# Patient Record
Sex: MALE | Race: OTHER
[De-identification: names, ages, dates, MRNs, and addresses within clinical notes are randomized per-mention and may not be internally consistent; named-entity substitution may affect disease eponyms.]

---

## 2019-02-26 ENCOUNTER — HOSPITAL ENCOUNTER (INPATIENT)
Dept: HOSPITAL 25 - ED | Age: 74
LOS: 7 days | DRG: 97 | End: 2019-03-05
Attending: INTERNAL MEDICINE | Admitting: INTERNAL MEDICINE
Payer: MEDICARE

## 2019-02-26 DIAGNOSIS — G93.41: ICD-10-CM

## 2019-02-26 DIAGNOSIS — E66.01: ICD-10-CM

## 2019-02-26 DIAGNOSIS — J81.0: ICD-10-CM

## 2019-02-26 DIAGNOSIS — R79.89: ICD-10-CM

## 2019-02-26 DIAGNOSIS — R29.703: ICD-10-CM

## 2019-02-26 DIAGNOSIS — E78.00: ICD-10-CM

## 2019-02-26 DIAGNOSIS — E87.0: ICD-10-CM

## 2019-02-26 DIAGNOSIS — E11.65: ICD-10-CM

## 2019-02-26 DIAGNOSIS — I25.10: ICD-10-CM

## 2019-02-26 DIAGNOSIS — A86: Primary | ICD-10-CM

## 2019-02-26 DIAGNOSIS — Z95.5: ICD-10-CM

## 2019-02-26 DIAGNOSIS — R47.01: ICD-10-CM

## 2019-02-26 DIAGNOSIS — I46.9: ICD-10-CM

## 2019-02-26 DIAGNOSIS — E03.9: ICD-10-CM

## 2019-02-26 DIAGNOSIS — R40.2412: ICD-10-CM

## 2019-02-26 DIAGNOSIS — Z95.1: ICD-10-CM

## 2019-02-26 DIAGNOSIS — I65.22: ICD-10-CM

## 2019-02-26 DIAGNOSIS — E78.5: ICD-10-CM

## 2019-02-26 DIAGNOSIS — G04.81: ICD-10-CM

## 2019-02-26 DIAGNOSIS — I10: ICD-10-CM

## 2019-02-26 DIAGNOSIS — N17.9: ICD-10-CM

## 2019-02-26 LAB
ALBUMIN SERPL BCG-MCNC: 4.5 G/DL (ref 3.2–5.2)
ALBUMIN/GLOB SERPL: 1.3 {RATIO} (ref 1–3)
ALP SERPL-CCNC: 87 U/L (ref 34–104)
ALT SERPL W P-5'-P-CCNC: 20 U/L (ref 7–52)
ANION GAP SERPL CALC-SCNC: 13 MMOL/L (ref 2–11)
APAP SERPL-MCNC: < 15 MCG/ML
AST SERPL-CCNC: 24 U/L (ref 13–39)
BASOPHILS # BLD AUTO: 0 10^3/UL (ref 0–0.2)
BUN SERPL-MCNC: 28 MG/DL (ref 6–24)
BUN/CREAT SERPL: 28.9 (ref 8–20)
CALCIUM SERPL-MCNC: 10.1 MG/DL (ref 8.6–10.3)
CHLORIDE SERPL-SCNC: 100 MMOL/L (ref 101–111)
CK SERPL-CCNC: 428 U/L (ref 10–223)
EOSINOPHIL # BLD AUTO: 0 10^3/UL (ref 0–0.6)
GLOBULIN SER CALC-MCNC: 3.5 G/DL (ref 2–4)
GLUCOSE SERPL-MCNC: 295 MG/DL (ref 70–100)
HCO3 SERPL-SCNC: 23 MMOL/L (ref 22–32)
HCT VFR BLD AUTO: 50 % (ref 42–52)
HGB BLD-MCNC: 16.9 G/DL (ref 14–18)
INR PPP/BLD: 1.11 (ref 0.77–1.02)
LYMPHOCYTES # BLD AUTO: 1.3 10^3/UL (ref 1–4.8)
MAGNESIUM SERPL-MCNC: 2.1 MG/DL (ref 1.9–2.7)
MCH RBC QN AUTO: 30 PG (ref 27–31)
MCHC RBC AUTO-ENTMCNC: 34 G/DL (ref 31–36)
MCV RBC AUTO: 88 FL (ref 80–94)
MONOCYTES # BLD AUTO: 0.9 10^3/UL (ref 0–0.8)
MONOCYTES NFR FLD: 35 %
NEUTROPHILS # BLD AUTO: 10.2 10^3/UL (ref 1.5–7.7)
NEUTS BAND NFR FLD: 1 %
NRBC # BLD AUTO: 0 10^3/UL
NRBC BLD QL AUTO: 0.1
PLATELET # BLD AUTO: 231 10^3/UL (ref 150–450)
POTASSIUM SERPL-SCNC: 4 MMOL/L (ref 3.5–5)
PROT SERPL-MCNC: 8 G/DL (ref 6.4–8.9)
RBC # BLD AUTO: 5.72 10^6/UL (ref 4–5.4)
RBC UR QL AUTO: (no result)
SODIUM SERPL-SCNC: 136 MMOL/L (ref 135–145)
TROPONIN I SERPL-MCNC: 0.02 NG/ML (ref ?–0.04)
TSH SERPL-ACNC: 2.38 MCIU/ML (ref 0.34–5.6)
WBC # BLD AUTO: 12.4 10^3/UL (ref 3.5–10.8)

## 2019-02-26 PROCEDURE — 99285 EMERGENCY DEPT VISIT HI MDM: CPT

## 2019-02-26 PROCEDURE — 62270 DX LMBR SPI PNXR: CPT

## 2019-02-26 PROCEDURE — 82570 ASSAY OF URINE CREATININE: CPT

## 2019-02-26 PROCEDURE — 36600 WITHDRAWAL OF ARTERIAL BLOOD: CPT

## 2019-02-26 PROCEDURE — 87641 MR-STAPH DNA AMP PROBE: CPT

## 2019-02-26 PROCEDURE — 87077 CULTURE AEROBIC IDENTIFY: CPT

## 2019-02-26 PROCEDURE — 84484 ASSAY OF TROPONIN QUANT: CPT

## 2019-02-26 PROCEDURE — 84157 ASSAY OF PROTEIN OTHER: CPT

## 2019-02-26 PROCEDURE — B338ZZZ MAGNETIC RESONANCE IMAGING (MRI) OF BILATERAL INTERNAL CAROTID ARTERIES: ICD-10-PCS

## 2019-02-26 PROCEDURE — 87086 URINE CULTURE/COLONY COUNT: CPT

## 2019-02-26 PROCEDURE — 93308 TTE F-UP OR LMTD: CPT

## 2019-02-26 PROCEDURE — 86788 WEST NILE VIRUS AB IGM: CPT

## 2019-02-26 PROCEDURE — 80053 COMPREHEN METABOLIC PANEL: CPT

## 2019-02-26 PROCEDURE — 80048 BASIC METABOLIC PNL TOTAL CA: CPT

## 2019-02-26 PROCEDURE — 86592 SYPHILIS TEST NON-TREP QUAL: CPT

## 2019-02-26 PROCEDURE — 85025 COMPLETE CBC W/AUTO DIFF WBC: CPT

## 2019-02-26 PROCEDURE — 70553 MRI BRAIN STEM W/O & W/DYE: CPT

## 2019-02-26 PROCEDURE — 86140 C-REACTIVE PROTEIN: CPT

## 2019-02-26 PROCEDURE — 87186 SC STD MICRODIL/AGAR DIL: CPT

## 2019-02-26 PROCEDURE — 87040 BLOOD CULTURE FOR BACTERIA: CPT

## 2019-02-26 PROCEDURE — 85610 PROTHROMBIN TIME: CPT

## 2019-02-26 PROCEDURE — 84300 ASSAY OF URINE SODIUM: CPT

## 2019-02-26 PROCEDURE — 93005 ELECTROCARDIOGRAM TRACING: CPT

## 2019-02-26 PROCEDURE — 82945 GLUCOSE OTHER FLUID: CPT

## 2019-02-26 PROCEDURE — 87102 FUNGUS ISOLATION CULTURE: CPT

## 2019-02-26 PROCEDURE — 93306 TTE W/DOPPLER COMPLETE: CPT

## 2019-02-26 PROCEDURE — 36415 COLL VENOUS BLD VENIPUNCTURE: CPT

## 2019-02-26 PROCEDURE — 86038 ANTINUCLEAR ANTIBODIES: CPT

## 2019-02-26 PROCEDURE — G0480 DRUG TEST DEF 1-7 CLASSES: HCPCS

## 2019-02-26 PROCEDURE — 84100 ASSAY OF PHOSPHORUS: CPT

## 2019-02-26 PROCEDURE — 86789 WEST NILE VIRUS ANTIBODY: CPT

## 2019-02-26 PROCEDURE — 83519 RIA NONANTIBODY: CPT

## 2019-02-26 PROCEDURE — 86618 LYME DISEASE ANTIBODY: CPT

## 2019-02-26 PROCEDURE — B33GZZZ MAGNETIC RESONANCE IMAGING (MRI) OF BILATERAL VERTEBRAL ARTERIES: ICD-10-PCS

## 2019-02-26 PROCEDURE — 81003 URINALYSIS AUTO W/O SCOPE: CPT

## 2019-02-26 PROCEDURE — 89051 BODY FLUID CELL COUNT: CPT

## 2019-02-26 PROCEDURE — C8929 TTE W OR WO FOL WCON,DOPPLER: HCPCS

## 2019-02-26 PROCEDURE — 84540 ASSAY OF URINE/UREA-N: CPT

## 2019-02-26 PROCEDURE — 81015 MICROSCOPIC EXAM OF URINE: CPT

## 2019-02-26 PROCEDURE — 85652 RBC SED RATE AUTOMATED: CPT

## 2019-02-26 PROCEDURE — 88304 TISSUE EXAM BY PATHOLOGIST: CPT

## 2019-02-26 PROCEDURE — 83520 IMMUNOASSAY QUANT NOS NONAB: CPT

## 2019-02-26 PROCEDURE — 86255 FLUORESCENT ANTIBODY SCREEN: CPT

## 2019-02-26 PROCEDURE — 80329 ANALGESICS NON-OPIOID 1 OR 2: CPT

## 2019-02-26 PROCEDURE — 80307 DRUG TEST PRSMV CHEM ANLYZR: CPT

## 2019-02-26 PROCEDURE — 86665 EPSTEIN-BARR CAPSID VCA: CPT

## 2019-02-26 PROCEDURE — 83516 IMMUNOASSAY NONANTIBODY: CPT

## 2019-02-26 PROCEDURE — 87798 DETECT AGENT NOS DNA AMP: CPT

## 2019-02-26 PROCEDURE — 87498 ENTEROVIRUS PROBE&REVRS TRNS: CPT

## 2019-02-26 PROCEDURE — 86703 HIV-1/HIV-2 1 RESULT ANTBDY: CPT

## 2019-02-26 PROCEDURE — 86738 MYCOPLASMA ANTIBODY: CPT

## 2019-02-26 PROCEDURE — 80320 DRUG SCREEN QUANTALCOHOLS: CPT

## 2019-02-26 PROCEDURE — 76775 US EXAM ABDO BACK WALL LIM: CPT

## 2019-02-26 PROCEDURE — 87070 CULTURE OTHR SPECIMN AEROBIC: CPT

## 2019-02-26 PROCEDURE — 74018 RADEX ABDOMEN 1 VIEW: CPT

## 2019-02-26 PROCEDURE — 85027 COMPLETE CBC AUTOMATED: CPT

## 2019-02-26 PROCEDURE — 86256 FLUORESCENT ANTIBODY TITER: CPT

## 2019-02-26 PROCEDURE — B33RZZZ MAGNETIC RESONANCE IMAGING (MRI) OF INTRACRANIAL ARTERIES: ICD-10-PCS

## 2019-02-26 PROCEDURE — 83605 ASSAY OF LACTIC ACID: CPT

## 2019-02-26 PROCEDURE — 84443 ASSAY THYROID STIM HORMONE: CPT

## 2019-02-26 PROCEDURE — 82947 ASSAY GLUCOSE BLOOD QUANT: CPT

## 2019-02-26 PROCEDURE — 70544 MR ANGIOGRAPHY HEAD W/O DYE: CPT

## 2019-02-26 PROCEDURE — 70549 MR ANGIOGRAPH NECK W/O&W/DYE: CPT

## 2019-02-26 PROCEDURE — 87496 CYTOMEG DNA AMP PROBE: CPT

## 2019-02-26 PROCEDURE — 82803 BLOOD GASES ANY COMBINATION: CPT

## 2019-02-26 PROCEDURE — 86787 VARICELLA-ZOSTER ANTIBODY: CPT

## 2019-02-26 PROCEDURE — 86664 EPSTEIN-BARR NUCLEAR ANTIGEN: CPT

## 2019-02-26 PROCEDURE — 82140 ASSAY OF AMMONIA: CPT

## 2019-02-26 PROCEDURE — 71045 X-RAY EXAM CHEST 1 VIEW: CPT

## 2019-02-26 PROCEDURE — 87205 SMEAR GRAM STAIN: CPT

## 2019-02-26 PROCEDURE — A9577 INJ MULTIHANCE: HCPCS

## 2019-02-26 PROCEDURE — 87529 HSV DNA AMP PROBE: CPT

## 2019-02-26 PROCEDURE — 95816 EEG AWAKE AND DROWSY: CPT

## 2019-02-26 PROCEDURE — 83735 ASSAY OF MAGNESIUM: CPT

## 2019-02-26 PROCEDURE — 82550 ASSAY OF CK (CPK): CPT

## 2019-02-26 PROCEDURE — 70450 CT HEAD/BRAIN W/O DYE: CPT

## 2019-02-26 PROCEDURE — 87899 AGENT NOS ASSAY W/OPTIC: CPT

## 2019-02-26 RX ADMIN — ENOXAPARIN SODIUM SCH MG: 40 INJECTION SUBCUTANEOUS at 19:43

## 2019-02-26 RX ADMIN — INSULIN LISPRO SCH UNITS: 100 INJECTION, SOLUTION INTRAVENOUS; SUBCUTANEOUS at 23:48

## 2019-02-26 RX ADMIN — ACYCLOVIR SODIUM SCH MLS/HR: 500 INJECTION, SOLUTION INTRAVENOUS at 23:48

## 2019-02-26 RX ADMIN — INSULIN LISPRO SCH UNITS: 100 INJECTION, SOLUTION INTRAVENOUS; SUBCUTANEOUS at 19:42

## 2019-02-26 RX ADMIN — LORAZEPAM PRN MG: 2 INJECTION INTRAMUSCULAR; INTRAVENOUS at 22:57

## 2019-02-26 NOTE — ED
Neurological HPI





- HPI Summary


HPI Summary: 


Pt is a 72 y/o male brought in by EMS who presents to the ED c/o left-sided 

weakness. As per EMS, his last known well was at 4:00 this morning. At 7:00 he 

was found with confusion and left-sided weakness, and pt was unable to stand 

up. Pt denies any headache. He lives with his grandson, who notes that he has 

been sleeping for the past few days and has not spoken much. Grandson also 

notes that pt fell both at 4:00 and 5:30 this morning. Pt is aphasic and is 

only able to answer simple yes or no questions. PMHx CAD, HTN, HLD. Mark Miller 

was called at 6:55 as per EMS, but was then cancelled at 7:25. Pt is a level 5 

caveat due to his AMS.





- History of Current Complaint


Stated Complaint: CODE GRAY


Hx Obtained From: Patient, EMS


Hx From Patient Unobtainable Due To: Altered Mental Status


Onset/Duration: Sudden Onset - 7:00 this morning, Started hours ago - Possibly 

several days ago., Still Present


Neurological Deficit Location: Generalized, LUE, LLE


Character: Weak, Confusion


Aggravating: Nothing


Alleviating: Nothing


Associated Signs and Symptoms: Negative: Headache





- Allergy/Home Medications


Allergies/Adverse Reactions: 


 Allergies











Allergy/AdvReac Type Severity Reaction Status Date / Time


 


No Known Allergies Allergy   Verified 02/26/19 07:34











Home Medications: 


 Home Medications





Aspirin EC TAB* [Ecotrin EC Low Dose 81 MG*] 81 mg PO DAILY 02/26/19 [History 

Confirmed 02/26/19]


Atenolol TAB* [Tenormin TAB* 50 MG] 100 mg PO DAILY 02/26/19 [History Confirmed 

02/26/19]


Dapagliflozin 10 mg Tab (Nf) [Farxiga] 10 mg PO DAILY 02/26/19 [History 

Confirmed 02/26/19]


Ezetimibe TAB* [Zetia TAB*] 10 mg PO DAILY 02/26/19 [History Confirmed 02/26/19]


Levothyroxine TAB* [Synthroid TAB*] 75 mcg PO DAILY 02/26/19 [History Confirmed 

02/26/19]


Lisinopril TAB* [Prinivil TAB*] 10 mg PO DAILY 02/26/19 [History Confirmed 02/26 /19]


Olmesartan/Amlodipin/Hcthiazid [Olmesartan Medoxomil/Amlo 40-10-25 mg] 1 tab PO 

DAILY 02/26/19 [History Confirmed 02/26/19]


Simvastatin (NF) [Zocor (NF)] 80 mg PO BEDTIME 02/26/19 [History Confirmed 02/26 /19]


SitaGLIPtin (NF) [Januvia (NF)] 100 mg PO DAILY 02/26/19 [History Confirmed 02/ 26/19]


glyBURIDE TAB* [Diabeta TAB*] 5 mg PO QAM 02/26/19 [History Confirmed 02/26/19]


metFORMIN* [Glucophage 500 MG TAB *] 1,000 mg PO BID 02/26/19 [History 

Confirmed 02/26/19]











PMH/Surg Hx/FS Hx/Imm Hx


Cardiovascular History: Reports: Hx Coronary Artery Disease, Hx 

Hypercholesterolemia, Hx Hypertension





- Surgical History


Surgery Procedure, Year, and Place: CABG





- Family History


Known Family History: Positive: Unknown - level 5 caveat - AMS





- Social History


Alcohol Use: None


Hx Substance Use: No


Substance Use Type: Reports: None


Hx Tobacco Use: No


Smoking Status (MU): Never Smoked Tobacco





Review of Systems


Neurological: Other - confusion


Positive: Weakness - left-sided.  Negative: Headache


All Other Systems Reviewed And Are Negative: No





Physical Exam





- Summary


Physical Exam Summary: 


Appearance: The patient is well-nourished in no acute distress and in no acute 

pain.


 


Skin: The skin is warm and dry and skin color reflects adequate perfusion.





HEENT: The head is normocephalic and atraumatic. The pupils are equal and 

reactive. The conjunctivae are clear and without drainage. Nares are patent and 

without drainage. Mouth reveals moist mucous membranes and the throat is 

without erythema and exudate. The external ears are intact. The ear canals are 

patent and without drainage. The tympanic membranes are intact.


 


Neck: The neck is supple with full range of motion and non-tender. There are no 

carotid bruits. There is no neck vein distension.


 


Respiratory: Chest is non-tender. Lungs are clear to auscultation and breath 

sounds are symmetrical and equal.


 


Cardiovascular: Heart is regular rate and rhythm. There is no murmur or rub 

auscultated. There is no peripheral edema and pulses are symmetrical and equal.


 


Abdomen: The abdomen is soft and non-tender. There are normal bowel sounds 

heard in all four quadrants and there is no organomegaly palpated.


 


Musculoskeletal: There is no back tenderness noted. Extremities are non-tender 

with full range of motion. There is good capillary refill. There is no 

peripheral edema or calf tenderness elicited.


 


Neurological: Patient is alert and oriented to time only. The patient has 

symmetrical motor strength in all four extremities. Cranial nerves are grossly 

intact. Deep tendon reflexes are symmetrical and equal in all four extremities. 

Aphasic but can answer yes and no questions. The patient is cooperative to the 

exam.


 


Psychiatric: The patient has an appropriate affect and does not exhibit any 

anxiety or depression.


Triage Information Reviewed: Yes


Vital Signs Reviewed: Yes


Completion Of Physical Exam Limited Due To: Level 5 - AMS





- [place] Coma Scale


Best Eye Response: 4 - Spontaneous


Best Motor Response: 6 - Obeys Commands


Best Verbal Response: 5 - Oriented


Coma Scale Total: 15





Diagnostics





- Laboratory


Result Diagrams: 


 02/26/19 07:34





 02/26/19 07:34


Lab Statement: Any lab studies that have been ordered have been reviewed, and 

results considered in the medical decision making process.





- Radiology


  ** CXR


Radiology Interpretation Completed By: Radiologist


Summary of Radiographic Findings: TORTUOUS AORTA WITH WIDENING OF THE UPPER 

MEDIASTINUM, PROGRESSED WHEN COMPARED TO THE CHEST X-RAY APRIL 15, 2009. 

CONSIDER FURTHER EVALUATION WITH CT ANGIOGRAPHY OF THE CHEST. ED physician 

reviewed radiology report.





- CT


  ** Brain CT


CT Interpretation Completed By: Radiologist


Summary of CT Findings: 1. Highly limited CT examination due to oblique 

positioning and motion artifact.  2. There is no CT apparent acute intracranial 

abnormality within the limitations of this low-quality CT of the brain.  3. 

Paranasal sinus mucosal disease.  ED physician reviewed radiology report.





- EKG


  ** 7:51


Cardiac Rate: NL - 96 bpm


EKG Rhythm: Sinus Rhythm


ST Segment: Normal


Ectopy: None


Summary of EKG Findings: RBBB, no STEMI





NIH Scale





- NIH Scale


Level of Consciousness: Alert/Keenly Responsive


Ask Patient the Month and His/Her Age: Both Correct


Ask Pt to Open/Close Eyes and /Release Non-Paretic Hand: Both Correctly


Best Gaze (Only Horizontal Eye Movement): Normal


Visual Field Testing: No Visual Loss


Facial Paresis-Pt to Smile & Close Eyes or Grimace Symmetry: Normal/Symmetrical


Motor Function - Right Arm: No Drift-Holds 10 Seconds


Motor Function - Left Arm: No Drift-Holds 10 Seconds


Motor Function - Right Leg: No Drift-Holds 10 Seconds


Motor Function - Left Leg: No Drift-Holds 10 Seconds


Limb Ataxia-Must be out of Proportion to Weakness Present: Absent


Sensory (Use Pinprick to Test Arms/Legs/Trunk/Face): Normal


Best Language (Describe Picture, Name Items): Severe Aphasia


Dysarthria (Read Several Words): Slurs Some Words


Extinction and Inattention: No Abnormality


Total Score: 3





Course/Dx





- Course


Course Of Treatment: Mr. Nelson was brought in by EMS.  They were called by his 

grandson who is 14 years old.  This is a bit hazy but apparently has been 

sleeping a lot and possibly slurring his speech for the last couple of days and 

fell in the wee hours this morning.  Ambulance found him to be confused and 

poorly responsive to verbal requests.  He felt that he had a left-sided 

weakness.  Code gray was called in the field and the patient was evaluated in 

the hallway on presentation.  He was noted to have an expressive aphasia and 

possibly a partial receptive aphasia.  He would follow some commands but not 

others and was easily distracted.  I could not find any focal neurologic 

deficit.  Because it appears as though it's been a couple of days since anyone 

interacted with him the code gray was canceled but he was sent for an immediate 

CT scan and labs were drawn.  He will great deal of difficulty holding still 

for the CT scan and was given Ativan which only helped marginally.  Dr. Casas 

was consult and came to the department to evaluate the patient.  He requested 

MR scans.  The patient needed to have conscious sedation for that so 

anesthesiology was contacted as was the hospitalist service for admission.





- Diagnoses


Provider Diagnoses: 


 CVA (cerebral vascular accident)








- Physician Notifications


Discussed Care Of Patient With: Maximo Casas


Time Discussed With Above Provider: 09:27


Instructed by Provider To: Other - 8020 - Dr. Casas will come to the ED for a 

consult. 1040 - Dr. Casas has evaluated the patient, after discussing the 

patient's case with Dr. Pinedo, the patient will be admitted, MRI and MRA to be 

done. 1105 -  Patients case was discussed with Dr. Xiong, Dr. Xiong accepts 

for admission.





- Critical Care Time


Critical Care Time: 30-74 min - 30 minutes





Discharge





- Sign-Out/Discharge


Documenting (check all that apply): Patient Departure - admit 





- Discharge Plan


Condition: Good


Disposition: ADMITTED TO [facility]





- Billing Disposition and Condition


Condition: GOOD


Disposition: Admitted to [facility]





- Attestation Statements


Document Initiated by Monseibe: Yes


Documenting Scribe: Cora Astudillo


Provider For Whom Scribe is Documenting (Include Credential): Allan Pinedo MD


Scribe Attestation: 


Cora REYES, scribed for Allan Pinedo MD on 02/26/19 at 

1711. 


Scribe Documentation Reviewed: Yes


Provider Attestation: 


The documentation as recorded by the scribe, Cora Astudillo 

accurately reflects the service I personally performed and the decisions made 

by me, Allan Pinedo MD


Status of Scribe Document: Viewed

## 2019-02-26 NOTE — CONS
CONSULTATION REPORT:

 

DATE OF CONSULT:  02/26/19

 

PATIENT OF:  Dr. Pinedo and Malika Mccain NP.

 

HISTORY OF PRESENT ILLNESS:  This is a 73-year-old man who is unable to provide 
any history due to aphasia.  Apparently, his symptoms began relatively acutely 
in the past 2 to 3 days.  He lives with his 14-year-old grandson, who noted to 
the EMS that the patient had been sleeping for the past few days' time and has 
not spoken much.  This morning, he fell couple of times and had some confusion.
  It is unclear whether his grandson noticed the left-sided weakness or whether 
it was the EMS, but he was brought in by ambulance earlier this morning.  He is 
unable to give history himself, but he has history of hypertension, 
hypercholesterolemia, obesity, diabetes, and coronary artery disease.  He is 
status post a CABG.  He never smoked tobacco.  He has no history of substance 
abuse, but it is unclear how that was obtained.

 

MEDICATIONS:  Include:

1.  Metformin possibly 500 mg twice a day.

2.  Simvastatin 10 mg daily.

3.  Tribenzor 1 tablet daily.

4.  Synthroid 100 mcg daily.

5.  Zetia 10 mg daily.

6.  Farxiga 5 mg daily.

7.  Atenolol 25 mg daily.

 

ALLERGIES:  He has no known allergies, but that will need to be confirmed.

 

REVIEW OF SYSTEMS:  Unobtainable.  There is no recent history in the chart.  
There is a history from August 2016, but just saying that he is getting 
colonoscopy.

 

PHYSICAL EXAM:  Vital Signs:  Blood pressure 144/101, oxygen sat 93, pulse 101, 
respirations 27, temperature 98.1.  Neurologic:  He was alert.  He said his 
name. He could say some 2-word phrases, but could not answer questions in a 
meaningful way.  He could not name objects.  When I asked him to touch his nose
, he raised his hands and was unable to cooperate with the exam.  He was not 
agitated.  On cranial nerves, there is no clear visual field cut to threat, but 
the exam is difficult. He did not cooperate with exam at all, but had full 
extraocular movements when he regarded my face.  There is a red reflex present 
in both eyes.  Pupils were equal, round, and reactive to light.  He had no 
clear facial asymmetry, but I could not fully assess this. His voice was not 
slurred.  He moved all extremities with power at least 4+/5, but he did not 
cooperate with things such as pronator drift or formal strength testing.  
Reflexes were 1 and equal.  Toes were equivocal to downgoing.  Chest:  Clear.  
Cardiovascular:  Regular rate and rhythm.  Abdomen: Soft with positive bowel 
sounds.

 

DIAGNOSTIC STUDIES/LAB DATA:  He had a CT scan which was limited because of 
movement artifact. He had a chest x-ray with a tortuous aorta, but no 
dilatation. He had normal sinus rhythm with right bundle-branch block.

 

His labs include white count 12.4, CBC otherwise normal.  INR 1.1.  Chemistry:  
He had a BUN of 28, creatinine 0.97, lactic acid 2.2, glucose 295, CPK of 428.  
TSH 4.28.  Toxicology negative for alcohol or Tylenol.

 

IMPRESSION:  Mr. Nelson appears alert and interactive, but my sense is that he 
has a dense aphasia, but has no clear-cut motor deficits or visual field cut 
that I can detect, although this may be limited by exam.  The differential 
would be most likely a subacute stroke beginning 3 days ago when he took to his 
bed and did not speak much, according to the 14-year-old grandson.  It is 
possible that something like herpes encephalitis could be going on, but I think 
this is much less likely. There is no fever and the symptoms have been present 
for 3 days' time.  We are going to sedate him and get an MRI with and without 
contrast as well as an MRA of the head and neck.  The MRA of the neck will be 
with and without contrast.  We will see what these findings show.  He could 
also be having subclinical seizures complicating this and we will be checking 
an EEG.  We will be doing this in the near future and workup will proceed from 
the findings on these studies.  He will clearly need to be admitted.

 

Thank you for sharing his case.

 

 [id number] #: [id number]

ANALIA

## 2019-02-26 NOTE — HP
CC:  Dr. Singh; Dr. Casas *

 

HISTORY AND PHYSICAL:

 

DATE OF ADMISSION:  02/26/19

 

PROVIDER:  Yamileth Sawyer NP

 

PRIMARY CARE PROVIDER:  Frieda Singh MD

 

ATTENDING PHYSICIAN WHILE IN THE HOSPITAL:  Dr. Susanne Xiong * (dictated by 
Yamileth Sawyer NP).

 

CHIEF COMPLAINT:  Altered mental status.

 

HISTORY OF PRESENT ILLNESS:  This was obtained from his EMS report and ER 
report as the patient has significant altered mental status. According to the 
ER report, Mr. Nelson is a 73-year-old male patient who was brought in by EMS and 
the initial complaint was left-sided weakness.  Per EMS, the patient was last 
normal at 4 this morning.  At 7, he was found with confusion, left- sided 
weakness, unable to stand up.  The patient currently lives with his grandson, 
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  Grandson reports that the patient had been 
sleeping more over the past few days and has not spoken much.  Grandson noted 
that the patient fell at 4 a.m., and at 5:30 this morning, on arrival to the 
emergency room, the patient was aphasic, only able to answer yes or no 
questions.  Code gray was called and was canceled at 7:25.  The patient carries 
a past medical history significant according to his medical records from his 
primary care provider of hypertension, hypothyroid, morbid obesity, type 2 
diabetes, high cholesterol, atherosclerosis of the coronary arteries, bypass 
graft .

 

The patient was seen by Neurology in the emergency room.  MRI and MRAs were 
ordered as well as CT of the brain, which did not show any acute stroke or 
hemorrhage.  Due to his profound aphasia and altered mental status, we were 
asked to see and evaluate him for admission.

 

PAST MEDICAL HISTORY:  Significant for: obtained from PCP medical records

1.  Hypertension.

2.  CAD.

3.  Diabetes, type 2.

4.  High cholesterol.

 

PAST SURGICAL HISTORY: obtained from PCP medical records

1.  CABG x4 vessels in 2003.

2.  Cardiac catheterization with 2 stent placements in 2009.

 

This was obtained from PCP medical records.

 

HOME MEDICATIONS:

1.  Lisinopril 10 mg p.o. daily.

2.  Glyburide 5 mg p.o. daily.

3.  Synthroid 75 mcg p.o. daily.

4.  Farxiga 10 mg p.o. daily.

5.  Zetia 10 mg p.o. daily.

6.  Simvastatin 80 mg p.o. daily.

7.  Tribenzor 40/10/25 p.o. daily.

8.  Atenolol 100 mg p.o. daily.

9.  Aspirin 81 mg p.o. daily.

10.  Janumet 5/1000 mg daily.

11.  Furosemide 20 mg daily.

 

ALLERGIES:  According to his medical records from his primary care office from 
02/08/19, no known drug allergies.

 

FAMILY HISTORY:  Unknown.

 

SOCIAL HISTORY:  According to his old records, the patient was a prior smoker.  
He quit in January 1996.  Prior to that, he smoked 2 packs a day for 
approximately 30 years.  He is a retired .  He lives with his 14 
y.o grandson.  Next of kin is his son, [place].  Code status is a full code at 
this point.

 

REVIEW OF SYSTEMS:  Unobtainable as the patient is aphasic and unable to follow 
commands or make needs known.  He does state yes and no, but answers are 
irrelevant to questions.

 

                               PHYSICAL EXAMINATION

 

GENERAL:  At this time, Mr. Nelson is a 73-year-old male.  He is aphasic.  He 
opens his eyes to verbal stimulation.  He is lying on the stretcher in the 
emergency room.  He restless on the stretcher.

 

VITAL SIGNS:  Blood pressure 153/99, heart rate 92, respirations 22, O2 
saturation 97%, temperature was 98.5.

 

HEENT:  Head is atraumatic, normocephalic.  Eyes:  EOMs are intact.  Sclerae 
anicteric and not pale.  Oral mucosa appeared to be moist.  There is no 
oropharyngeal erythema.

 

NECK:  Supple.  C-spine was palpated with no grimacing noted with palpation.

 

LUNGS:  Clear to auscultation bilaterally.  No wheezes, rales, or rhonchi.

 

CARDIAC:  S1, S2.  Regular rate and rhythm.  No murmurs, rubs, or gallops.

 

ABDOMEN:  Obese, soft, appears to be nontender, there is no grimacing with 
palpation to the abdomen.  Bowel sounds are present x4.

 

EXTREMITIES:  He is able to move all 4 extremities.

 

NEUROLOGIC:  He opens eyes to verbal.  He is unable to follow commands.  He is 
aphasic.  He can state 1 word phrases.  Speech is clear with yes and no answers
, which do not correlate to questions.     There is no gross facial asymmetry 
noted.  Unable to complete a full exam as the patient does not follow commands.

 

SKIN:  Intact.

 

 DIAGNOSTIC STUDIES AND LABORATORY DATA:  WBCs are 12.4, RBCs 5.72, hemoglobin 
16.9, hematocrit is 50, platelet count 231.  INR was 1.11.  Sodium 136, 
potassium 4.0, chloride 100, carbon dioxide was 23, anion gap was 13, BUN was 28
, creatinine 0.97, glucose was 295.  Lactic acid was 2.2, calcium 10.1, 
magnesium 2.1.  Total bili was 1.20, ASTs were 24, ALTs were 20, alkaline 
phosphatase was 87.  Ammonia was 41. Total CK was 428.  Troponin was 0.02.  TSH 
was 238.  Acetaminophen and alcohol levels were negative.

 

He had an EKG which showed right bundle-branch block at a rate of 96 with sinus 
rhythm.

 

He had a CT of the brain, radiologist's impression:  Highly limited CT 
examination due to oblique positioning and motion artifact.  There is no CT 
apparent acute intracranial abnormality within limitations of this low quality 
CT brain. Parasinus mucosal disease.

 

He had a chest x-ray, radiologist's impression:  Tortuous aorta with widening 
of the upper mediastinum, progressed when compared to chest x-ray of 04/15/09. 
Consider further evaluation with CT angiography of the chest.

 

He had an MRI of the brain, radiologist's impression:  No restricted diffusion 
to suggest acute infarct, moderate sinus mucosal inflammatory disease with air 
fluid levels in the right maxillary sinus.

 

He had an MRA of the head.  Again, the study was limited.  There is no 
appreciable aneurysm, vascular malformation, occlusion, or stenosis of the 
visualized intracranial circulation.

 

He had an MRI of the neck, radiologist's impression:  Limited study, 50% short- 
segment in the left internal carotid artery stenosis, no right internal carotid 
artery stenosis.

 

He had an abdominal x-ray, bowel gas patterns are unremarkable.

 

ASSESSMENT AND PLAN:  Mr. Nelson is a 73-year-old male who presented to the 
emergency room via EMS for altered mental status.  He is aphasic.  Due to his 
aphasia and altered mental status, we were asked to see and evaluate him for 
admission.

 

1.  Altered mental status.  It is unclear at this time the cause of his aphasia-
-the differential is broad and includes infectious, ischemic stroke, metabolic 
encephalopathy , but at this time there is not a clear explanation.   Clinical 
suspicion at this time is CVA or encephalitis. The patient is currently being 
worked up for cerebrovascular accident.  He did have a MRI, MRA of the head and 
neck as well as an MRI of the brain which did not show any acute infarct.  We 
will get an LP and I will arrange this with anesthesia.  I have discussed these 
results with Dr. Casas who is recommending getting an LP and sending CSF for 
lab studies.  We will also get an EEG.  He will be placed in ICU.  He will be 
monitored on telemetry.  We will get neuro checks q.2 hours. I will get an echo 
with bubble study. Lipid profile. 

2.  History of coronary artery disease.  At this time, I am going to hold his 
simvastatin and furosemide as his mental status is to altered to take oral 
medications. 

3.  Hypertension.  I will give him hydralazine p.r.n. as needed for systolic 
blood pressure greater than 180.

4.  Diabetes.  I will place him on Accu-Cheks q.6 hours and sliding scale 
lispro. I will hold his Janumet.

5.  Hypothyroid.  I will give him his Synthroid IV.

6.  FEN.  He will be n.p.o.

7.  Code status.  He is a full code.

8.  DVT prophylaxis.  I will place him on Lovenox subcu.

 

TIME SPENT:  Time spent on this admission was 90 minutes, greater than half the 
time was spent reviewing records of events leading thus far to this 
hospitalization and contacting speciality providers and implementing my plan of 
care.

 

I have discussed this with my attending, Dr. Susanne Xiong; she is in 
agreement with my plan.

 

____________________________________ YAMILETH SAWYER, NP

 

[id number] #: [id number]

ANALIA

## 2019-02-27 LAB
ANION GAP SERPL CALC-SCNC: 11 MMOL/L (ref 2–11)
BASOPHILS # BLD AUTO: 0.1 10^3/UL (ref 0–0.2)
BUN SERPL-MCNC: 32 MG/DL (ref 6–24)
BUN/CREAT SERPL: 27.1 (ref 8–20)
CALCIUM SERPL-MCNC: 9.4 MG/DL (ref 8.6–10.3)
CHLORIDE SERPL-SCNC: 106 MMOL/L (ref 101–111)
EOSINOPHIL # BLD AUTO: 0.1 10^3/UL (ref 0–0.6)
FLUAV RNA SPEC QL NAA+PROBE: NEGATIVE
FLUBV RNA SPEC QL NAA+PROBE: NEGATIVE
GLUCOSE SERPL-MCNC: 200 MG/DL (ref 70–100)
HCO3 SERPL-SCNC: 23 MMOL/L (ref 22–32)
HCT VFR BLD AUTO: 49 % (ref 42–52)
HGB BLD-MCNC: 16.4 G/DL (ref 14–18)
INR PPP/BLD: 1.03 (ref 0.77–1.02)
LYMPHOCYTES # BLD AUTO: 1.9 10^3/UL (ref 1–4.8)
MCH RBC QN AUTO: 29 PG (ref 27–31)
MCHC RBC AUTO-ENTMCNC: 33 G/DL (ref 31–36)
MCV RBC AUTO: 87 FL (ref 80–94)
MONOCYTES # BLD AUTO: 1.8 10^3/UL (ref 0–0.8)
NEUTROPHILS # BLD AUTO: 12.2 10^3/UL (ref 1.5–7.7)
NRBC # BLD AUTO: 0 10^3/UL
NRBC BLD QL AUTO: 0.1
PLATELET # BLD AUTO: 196 10^3/UL (ref 150–450)
POTASSIUM SERPL-SCNC: 3.6 MMOL/L (ref 3.5–5)
RBC # BLD AUTO: 5.62 10^6/UL (ref 4–5.4)
SODIUM SERPL-SCNC: 140 MMOL/L (ref 135–145)
WBC # BLD AUTO: 16 10^3/UL (ref 3.5–10.8)

## 2019-02-27 PROCEDURE — 4A00X4Z MEASUREMENT OF CENTRAL NERVOUS ELECTRICAL ACTIVITY, EXTERNAL APPROACH: ICD-10-PCS | Performed by: PSYCHIATRY & NEUROLOGY

## 2019-02-27 RX ADMIN — SODIUM CHLORIDE SCH MLS/HR: 900 IRRIGANT IRRIGATION at 13:31

## 2019-02-27 RX ADMIN — METOPROLOL TARTRATE SCH MG: 5 INJECTION, SOLUTION INTRAVENOUS at 14:38

## 2019-02-27 RX ADMIN — DEXMEDETOMIDINE HYDROCHLORIDE SCH MLS/HR: 100 INJECTION, SOLUTION, CONCENTRATE INTRAVENOUS at 17:16

## 2019-02-27 RX ADMIN — INSULIN LISPRO SCH UNITS: 100 INJECTION, SOLUTION INTRAVENOUS; SUBCUTANEOUS at 18:08

## 2019-02-27 RX ADMIN — AMPICILLIN SODIUM SCH MLS/HR: 2 INJECTION, POWDER, FOR SOLUTION INTRAVENOUS at 09:30

## 2019-02-27 RX ADMIN — HYDRALAZINE HYDROCHLORIDE PRN MG: 20 INJECTION INTRAMUSCULAR; INTRAVENOUS at 14:39

## 2019-02-27 RX ADMIN — LORAZEPAM PRN MG: 2 INJECTION INTRAMUSCULAR; INTRAVENOUS at 10:43

## 2019-02-27 RX ADMIN — LORAZEPAM PRN MG: 2 INJECTION INTRAMUSCULAR; INTRAVENOUS at 03:08

## 2019-02-27 RX ADMIN — INSULIN LISPRO SCH UNITS: 100 INJECTION, SOLUTION INTRAVENOUS; SUBCUTANEOUS at 13:17

## 2019-02-27 RX ADMIN — METOPROLOL TARTRATE SCH MG: 5 INJECTION, SOLUTION INTRAVENOUS at 21:22

## 2019-02-27 RX ADMIN — AMPICILLIN SODIUM SCH MLS/HR: 2 INJECTION, POWDER, FOR SOLUTION INTRAVENOUS at 13:04

## 2019-02-27 RX ADMIN — INSULIN LISPRO SCH UNITS: 100 INJECTION, SOLUTION INTRAVENOUS; SUBCUTANEOUS at 05:46

## 2019-02-27 RX ADMIN — ACYCLOVIR SODIUM SCH MLS/HR: 500 INJECTION, SOLUTION INTRAVENOUS at 15:25

## 2019-02-27 RX ADMIN — LEVOTHYROXINE SODIUM ANHYDROUS SCH MCG: 100 INJECTION, POWDER, LYOPHILIZED, FOR SOLUTION INTRAVENOUS at 05:46

## 2019-02-27 RX ADMIN — DEXMEDETOMIDINE HYDROCHLORIDE SCH MLS/HR: 100 INJECTION, SOLUTION, CONCENTRATE INTRAVENOUS at 21:43

## 2019-02-27 RX ADMIN — ENOXAPARIN SODIUM SCH MG: 40 INJECTION SUBCUTANEOUS at 17:30

## 2019-02-27 RX ADMIN — LORAZEPAM PRN MG: 2 INJECTION INTRAMUSCULAR; INTRAVENOUS at 21:49

## 2019-02-27 RX ADMIN — ACYCLOVIR SODIUM SCH MLS/HR: 500 INJECTION, SOLUTION INTRAVENOUS at 23:15

## 2019-02-27 RX ADMIN — AMPICILLIN SODIUM SCH MLS/HR: 2 INJECTION, POWDER, FOR SOLUTION INTRAVENOUS at 01:35

## 2019-02-27 RX ADMIN — INSULIN GLARGINE SCH UNITS: 100 INJECTION, SOLUTION SUBCUTANEOUS at 09:13

## 2019-02-27 RX ADMIN — AMPICILLIN SODIUM SCH MLS/HR: 2 INJECTION, POWDER, FOR SOLUTION INTRAVENOUS at 05:09

## 2019-02-27 RX ADMIN — ACYCLOVIR SODIUM SCH MLS/HR: 500 INJECTION, SOLUTION INTRAVENOUS at 09:13

## 2019-02-27 RX ADMIN — INSULIN LISPRO SCH UNITS: 100 INJECTION, SOLUTION INTRAVENOUS; SUBCUTANEOUS at 23:34

## 2019-02-27 RX ADMIN — AMPICILLIN SODIUM SCH MLS/HR: 2 INJECTION, POWDER, FOR SOLUTION INTRAVENOUS at 17:30

## 2019-02-27 RX ADMIN — HYDRALAZINE HYDROCHLORIDE PRN MG: 20 INJECTION INTRAMUSCULAR; INTRAVENOUS at 08:06

## 2019-02-27 RX ADMIN — LORAZEPAM PRN MG: 2 INJECTION INTRAMUSCULAR; INTRAVENOUS at 14:39

## 2019-02-27 RX ADMIN — METOPROLOL TARTRATE SCH MG: 5 INJECTION, SOLUTION INTRAVENOUS at 09:13

## 2019-02-27 RX ADMIN — AMPICILLIN SODIUM SCH MLS/HR: 2 INJECTION, POWDER, FOR SOLUTION INTRAVENOUS at 21:22

## 2019-02-27 RX ADMIN — INSULIN LISPRO SCH: 100 INJECTION, SOLUTION INTRAVENOUS; SUBCUTANEOUS at 06:53

## 2019-02-27 NOTE — EEG
ELECTROENCEPHALOGRAPHY:

 

DATE OF STUDY:  ___________ - ROOM #ICU-08



DATE OF DICTATION:  02/27/19

 

PATIENT OF:  Yamileth Sawyer NP

 

HISTORY:  This is a 73-year-old man being evaluated for encephalopathy and 
aphasia with an abnormal CSF.

 

MEDICATIONS: Include:

1.  Atenolol.

2.  Farxiga

3.  Zetia.

4.  Synthroid.

5.  Tribenzor.

6.  Simvastatin.

7.  Metformin.

 

INTERPRETATION:  With the patient awake, background cerebral activity consists 
of moderate amplitude diffuse 6 to 7 Hz rhythm, with some admixed delta activity
, some of the faster theta activity at times is better seen on the left than 
the right, but this is an inconsistent finding.

 

No epileptiform potentials are noted.  EEG artifact is noted at times.

 

IMPRESSION:  This EEG is abnormal because of diffuse slowing of background 
consistent with an encephalopathy, nonspecific ST etiology.  No epileptiform 
potential is noted.  There is some minor asymmetry of background as described 
above.

 

[id number] #: [id number]

[facility]

## 2019-02-27 NOTE — ECHO
Patient:      LEANDRO ABARCA  

[facility] Rec#:     [id number]            :          1945          

Date:         2019            Age:          73y                 

Account#:     [id number]          Height:       168 cm / 66.1 in

Accession#:   [id number]           Weight:       144 kg / 317.4 lbs

Sex:          M                     BSA:          2.44

Room#:        [facility]-8                 

Admit Date#:  2019          

Type:         Inpatient

 

Referring:    Yamileth Sawyer

Reading:      Michelle Greene MD

CC:           Maximo Casas MD

______________________________________________________________________

 

Transthoracic Echocardiogram

 

Indication:

CVA

BP:           151/75

HR:           128

Rhythm:       Tachycardia

 

Findings     

Technical Comments:

The study is technically difficult.   Completed at 0845. Definity used

to enhance images. The study is technically limited due to poor acoustic

windows.  The study is technically limited due to patient body habitus. 

The study was technically limited due to the patient's inability to lay

in the left lateral decubitus position.  

 

Left Ventricle:

The left ventricular chamber size is normal. Moderate concentric left

ventricular hypertrophy is observed. Left ventricular systolic function

is at the lower limits of normal.  The estimated ejection fraction is

50-55%.  The assessment of diastolic function is non-diagnostic.  Mitral

inflow pattern appeared fused. 

 

Left Atrium:

The left atrium is moderately dilated. 

 

Right Ventricle:

The right ventricle is not well visualized. The right ventricular global

systolic function is normal. based on parasternal views. 

 

Right Atrium:

The right atrium is not well visualized. A patent foramen ovale is not

demonstrated with color Doppler and agitated contrast.   Apical window

is limited due to body habitus. 

 

Aortic Valve:

The aortic valve is trileaflet. There is no evidence of aortic

regurgitation. There is no evidence of aortic stenosis. 

 

Mitral Valve:

The mitral valve leaflets are mildly thickened. There is no evidence of

mitral regurgitation. There is no evidence of mitral stenosis. 

 

Tricuspid Valve:

The tricuspid valve structure is not well visualized. 

 

Pulmonic Valve:

The pulmonic valve appears normal. There is no evidence of pulmonic

regurgitation. There is no pulmonic stenosis.  

 

Pericardium:

A pericardial fat pad is visualized. 

 

Aorta:

There is mild dilatation of the ascending aorta. The aortic arch is not

well visualized.  There is no dilation of the aortic root. 

 

Pulmonary Artery:

The main pulmonary artery appears normal. 

 

Venous:

The venous system appears normal. 

 

Contrast:

Definity was used to optimize study.   A total of 5 ml used. Intravenous

contrast was used to enhance endocardial border definition. Intravenous

agitated saline contrast was used to assess intracardiac shunting.  

 

Conclusions

The study is technically limited due to patient body habitus. 

Moderate concentric left ventricular hypertrophy is observed.

EF 50-55%.

The assessment of diastolic function is non-diagnostic. 

The left atrium is moderately dilated.

No evidence of a patent foramen ovale with color Doppler and agitated

contrast, imaging suboptimal for this.

All valve show grossly normal function.

No prior echo to compare.

If clinically indicated consider GRIFFIN (transesophogeal echo) for improved

imaging and evaluationi for possible cardioembolic source.

There is mild dilatation of the ascending aorta:  3.6 cm.

 

Measurements     

Name                    Value         Normal Range            

RVIDd (AP) 2D           3.2 cm        (0.9 - 2.6)             

IVSd (2D)               1.6 cm        (0.6 - 1)               

LVPWd (2D)              1.3 cm        (0.6 - 1)               

LVIDd (2D)              3.9 cm        (3.6 - 5.4)             

LVIDs (2D)              3.5 cm        -                        

Aortic Annulus          2.1 cm        (1.4 - 2.6)             

Ao root diameter (2D)   3.2 cm        (2.1 - 3.5)             

Ascending Ao            3.6 cm        (2.1 - 3.4)             

LA dimension (AP) 2D    4.9 cm        (2.3 - 3.8)             

 

Name                    Value         Normal Range            

MV E-wave Vmax          0.8 m/sec     -                        

MV deceleration time    150 msec      -                        

MV A-wave Vmax          1.3 m/sec     -                        

MV E:A ratio            0.6 ratio     -                        

LV septal e' Vmax       0.16 m/sec    -                        

LV lateral e' Vmax      0.2 m/sec     -                        

LV E:e' septal ratio    5 ratio       -                        

LV E:e' lateral ratio   4 ratio       -                        

 

Name                    Value         Normal Range            

AV Vmax                 1.3 m/sec     -                        

AV VTI                  20.2 cm       -                        

AV peak gradient        7 mmHg        -                        

AV mean gradient        3 mmHg        -                        

LVOT Vmax               0.8 m/sec     -                        

LVOT VTI                15.8 cm       -                        

LVOT peak gradient      4 mmHg        -                        

LVOT mean gradient      2 mmHg        -                        

 

Name                    Value         Normal Range            

PV Vmax                 1.1 m/sec     -                        

 

Electronically signed by: Michelle Greene MD on 2019 16:57:49

## 2019-02-27 NOTE — PN
Subjective


Date of Service: 02/27/19


Interval History: 





Pt is unable to tell me how he feels. Nursing notes the patient will wake up 

when you call his name and answer minimal yes/no questions but they question 

how reliable his answers are. He was able to state how many fingers the nurse 

was holding up but when she tried again he was unable to. 





Objective


Active Medications: 








Dextrose (D50w Syringe 50 Ml*)  12.5 gm IV PUSH .FOR FS < 60 - SS PRN


   PRN Reason: FS < 60


Enoxaparin Sodium (Lovenox(*))  40 mg SUBCUT Q24H [facility]


   Last Admin: 02/26/19 19:43 Dose:  40 mg


Hydralazine HCl (Apresoline Iv*)  5 mg IV SLOW PU Q6H PRN


   PRN Reason: Systolic Bp Greater Than:190


   Last Admin: 02/27/19 08:06 Dose:  5 mg


Ampicillin Sodium 2 gm/ Sodium (Chloride)  100 mls @ 200 mls/hr IVPB Q4HR [facility]


   Last Admin: 02/27/19 05:09 Dose:  200 mls/hr


Acyclovir Sodium 900 mg/ (Sodium Chloride)  268 mls @ 268 mls/hr IVPB Q8H [facility]


   Last Admin: 02/26/19 23:48 Dose:  268 mls/hr


Insulin Human Lispro (Humalog*)  0 units SUBCUT Q6HR [facility]; Protocol


   Last Admin: 02/27/19 06:53 Dose:  Not Given


Levothyroxine Sodium (Synthroid Inj*)  37.5 mcg IV 0600 [facility]


   Last Admin: 02/27/19 05:46 Dose:  37.5 mcg


Lorazepam (Ativan Inj*)  2 mg IV PUSH Q4H PRN


   PRN Reason: ANXIETY


   Last Admin: 02/27/19 03:08 Dose:  2 mg


Quetiapine Fumarate (Seroquel Tab*)  25 mg PO QPM PRN


   PRN Reason: AGITATION








 Vital Signs - 8 hr











  02/27/19 02/27/19 02/27/19





  00:31 01:00 01:01


 


Temperature   


 


Pulse Rate 101 99 104


 


Respiratory 20 19 13





Rate   


 


Blood Pressure 104/69  167/103





(mmHg)   


 


O2 Sat by Pulse 92 93 95





Oximetry   














  02/27/19 02/27/19 02/27/19





  01:31 02:00 02:01


 


Temperature   


 


Pulse Rate 105 113 108


 


Respiratory 20 20 23





Rate   


 


Blood Pressure 160/98  





(mmHg)   


 


O2 Sat by Pulse 94 94 93





Oximetry   














  02/27/19 02/27/19 02/27/19





  02:04 02:31 02:55


 


Temperature   


 


Pulse Rate 117 114 


 


Respiratory 24 25 23





Rate   


 


Blood Pressure 143/108 139/84 





(mmHg)   


 


O2 Sat by Pulse 92 95 





Oximetry   














  02/27/19 02/27/19 02/27/19





  03:01 03:08 03:31


 


Temperature   


 


Pulse Rate 109  114


 


Respiratory 25 24 21





Rate   


 


Blood Pressure   169/105





(mmHg)   


 


O2 Sat by Pulse 94  94





Oximetry   














  02/27/19 02/27/19 02/27/19





  04:00 04:01 04:30


 


Temperature 97.9 F  


 


Pulse Rate 98 100 96


 


Respiratory 19 29 25





Rate   


 


Blood Pressure 157/103  161/97





(mmHg)   


 


O2 Sat by Pulse 95 95 94





Oximetry   














  02/27/19 02/27/19 02/27/19





  05:00 05:01 05:02


 


Temperature   


 


Pulse Rate 111 110 


 


Respiratory 21 15 





Rate   


 


Blood Pressure  188/118 





(mmHg)   


 


O2 Sat by Pulse 95 95 95





Oximetry   














  02/27/19 02/27/19 02/27/19





  05:07 05:30 05:49


 


Temperature   


 


Pulse Rate 102 118 


 


Respiratory 19 15 17





Rate   


 


Blood Pressure 165/103 192/175 





(mmHg)   


 


O2 Sat by Pulse 96 92 





Oximetry   














  02/27/19 02/27/19





  06:00 06:01


 


Temperature  


 


Pulse Rate 116 115


 


Respiratory 23 23





Rate  


 


Blood Pressure  151/75





(mmHg)  


 


O2 Sat by Pulse 93 92





Oximetry  











Oxygen Devices in Use Now: Nasal Cannula - 2L-95%


Appearance: Elderly morbidly obese male lying flat in bed, awakes to calling 

his name but otherwise sleeps, NAD


Eyes: No Scleral Icterus


Ears/Nose/Mouth/Throat: Mucous Membranes Moist


Respiratory: Symmetrical Chest Expansion and Respiratory Effort, Clear to 

Auscultation - anteriorly


Cardiovascular: NL Sounds; No Murmurs; No JVD, No Edema, - - tachycardic but 

regular


Abdominal: NL Sounds; No Tenderness; No Distention - obese


Extremities: No Clubbing, Cyanosis


Skin: No Nodules or Sclerosis


Neurological: - - unable to carry on coversation, wakes up and appears to be 

blankly starring, promptly falls back asleep


Result Diagrams: 


 02/27/19 05:57





 02/27/19 05:57


Microbiology and Other Data: 


 Microbiology











 02/26/19 17:40 CSF Gram Stain (Tube 3) - Final





 Cerebral Spinal Fluid 


 


 02/26/19 19:50 Nasal Screen MRSA (PCR) - Final





 Nasal    Mrsa Not Detected














Assess/Plan/Problems-Billing


Mr Nelson is a 74 yo M who has a h/o DM, HTN, morbid obesity and CAD who 

presented to the ER after he was noted to fall a couple times but was also 

aphasic and subsequently found to be weak on the left. 





- Patient Problems


(1) Altered mental status


Current Visit: Yes   Status: Acute   Code(s): R41.82 - ALTERED MENTAL STATUS, 

UNSPECIFIED   SNOMED Code(s): 270704594


   Comment: The patient is lethargic but arousable. Unable to speak more than a 

couple words. DDx includes CVA (but likely ruled out with negative MRI), ? 

viral meningitis/encephalitis (16 WBC in CSF with high protein level), non 

convulsive status-EEG pending for today. No clear signs of bacterial infection 

at this time though he has a large, grapefruit sized soft mass/fluid collection 

on the L posterior upper arm that is warm to touch, his WBC count is elevated, 

planning on US this am to better asses. Urine looks like the patient is dry but 

no signs of UTI. Will disucss CSF results with Dr. Steinberg. Await EEG report. 

Continue neurochecks. Continue ampicillin and acyclovir for now.   





(2) Aphasia


Current Visit: Yes   Status: Acute   Code(s): R47.01 - APHASIA   SNOMED Code(s)

: 36196422


   Comment: Unclear cause at this time. He was able to speak a couple words to 

me this am. CVA was high on the differential though MRI was negative. Will 

continue to monitor for changes.    





(3) Elevated serum creatinine


Current Visit: Yes   Status: Acute   Code(s): R79.89 - OTHER SPECIFIED ABNORMAL 

FINDINGS OF BLOOD CHEMISTRY   SNOMED Code(s): 271510054


   Comment: Creatinine is up today from prior, will increase NS to 125ml/hr and 

recheck labs in AM.   





(4) HTN (hypertension)


Current Visit: Yes   Status: Acute   Code(s): I10 - ESSENTIAL (PRIMARY) 

HYPERTENSION   SNOMED Code(s): 12325590


   Comment: BP has been quite elevated but he has not had his usual home meds 

as he can not safely take them in by mouth. Will start metoprolol 5mg IV q6hr 

for BP and HR control.    





(5) Type II diabetes mellitus


Current Visit: Yes   Status: Acute   Comment: Sugars are moderately elevated. 

Will start lantus 5 units daily and continue with lispro sliding scale q6hr.    





(6) CAD (coronary artery disease)


Current Visit: Yes   Status: Acute   Code(s): I25.10 - ATHSCL HEART DISEASE OF 

NATIVE CORONARY ARTERY W/O ANG PCTRS   SNOMED Code(s): 15080819


   Comment: No c/o chest pain at this time. He received rectal ASA last 

evening. His last stents were several years ago. Start metoprolol as above.    





(7) DVT prophylaxis


Current Visit: Yes   Status: Acute   Code(s): YXO1293 -    SNOMED Code(s): 

937408782


   Comment: lovenox   





(8) Full code status


Current Visit: Yes   Status: Acute   Code(s): Z78.9 - OTHER SPECIFIED HEALTH 

STATUS   SNOMED Code(s): 285124805

## 2019-02-27 NOTE — PN
NEUROLOGICAL FOLLOWUP:

 

DATE OF CONSULT:  02/27/19

 

PATIENT OF:  Dr. Tenorio.

 

HISTORY:  This is a 73-year-old man who remains encephalopathic and aphasic.  
He is unable to give any further history.  Of note, I spoke to his son 
yesterday prior to the spinal tap and explained in detail the concerns about 
stroke versus infection and he agreed to having spinal tap done.  He did not 
provide any additional medical information regarding the patient that we did 
not know already.

 

MEDICATIONS:  Lanre's medications now include:

1.  Acyclovir.

2.  Ampicillin.

3.  Lovenox subcu.

4.  Hydralazine 5 mg IV p.r.n. elevated blood pressure.

5.  Insulin 5 units subcu 24 hours.

6.  Synthroid 37.5 mcg daily.

7.  Lopressor 5 mg IV q.6 hours.

 

REVIEW OF SYSTEMS:  Unable to be obtained.

 

PHYSICAL EXAM:  Temperature 97, pulse 102, respirations 25, blood pressure 134/
85. He was alert.  He could answer to his name.  He has spoken more words 
today.  He said it is Lanre rather than just Lanre and he seemed to be a 
little bit more fluent in terms of saying a couple words more frequently.  
However, he did not say that he was in the hospital, or the year, and he did 
not follow commands.  He moved all extremities with power but was not 
cooperative directly with the exam.  Reflexes were 1.  Toes were equivocal.  
Chest:  Clear.  Cardiovascular:  Regular rate and rhythm.  Abdomen:  Soft with 
positive bowel sounds.

 

DIAGNOSTIC STUDIES/LAB DATA:  His MRI scan yesterday showed no acute findings.  
No evidence of stroke.  He had air fluid level on his right maxillary sinus.  
His MRA of his head and neck was limited but did show 50% stenosis of the left 
internal carotid artery.

 

He had gone ahead and done a spinal tap afterwards since there is not clearly 
stroke documented on MRI scan.  I have obtained informed consent from the son 
and the CSF results was rbc 0, white blood cell count was 16 with majority of 
the lymphs and monos, total protein was 53.  Additional studies including 
cryptococcal antigen, herpes PCR are pending.  There is extra fluid saved.  I 
discussed with Yamileth Sawyer the last night when it came back that we would 
have him on acyclovir and ampicillin to cover the possibility of Listeria, 
although this would be unlikely.  It is possible that this could be herpes and 
I discussed that with the son prior to the spinal tap.  I have asked for an 
infectious disease consult to be obtained to see if there needed to be 
additional antibiotics placed and if there is any further testing needed on the 
CSF.  His UA had 3+ glucose, 1+ ketones, specific gravity of 1.038, BUN 32, 
creatinine 1.18, glucose of 200.  Blood gas had a pO2 of 83.  CBC is 16 today 
with platelets of 196, hematocrit 49.

 

His EEG showed diffuse slowing, there was some minor asymmetry seen but no 
epileptiform potentials.

 

IMPRESSION:  Lanre remains encephalopathic and aphasic but he seems slightly 
more verbal than yesterday.  Clearly, he is not worse than he was yesterday.  I 
think most likely this represents a meningoencephalitis, possibly viral and 
other studies are pending.  I will touch base with Dr. Tenorio now to make sure 
ID has been contacted here.  If Dr. Steinberg is not available, we would need an 
ID phone consult from either [place] or [place].  He has had no apparent 
seizures and his EEG was consistent with an encephalopathy but did not show 
seizures at this time.

 

 [id number]/[facility] #: [id number]

[facility]

## 2019-02-28 LAB
ANION GAP SERPL CALC-SCNC: 7 MMOL/L (ref 2–11)
BUN SERPL-MCNC: 39 MG/DL (ref 6–24)
BUN/CREAT SERPL: 15.4 (ref 8–20)
CALCIUM SERPL-MCNC: 8.9 MG/DL (ref 8.6–10.3)
CHLORIDE SERPL-SCNC: 111 MMOL/L (ref 101–111)
GLUCOSE SERPL-MCNC: 268 MG/DL (ref 70–100)
HCO3 SERPL-SCNC: 24 MMOL/L (ref 22–32)
HCT VFR BLD AUTO: 45 % (ref 42–52)
HGB BLD-MCNC: 15.2 G/DL (ref 14–18)
MCH RBC QN AUTO: 30 PG (ref 27–31)
MCHC RBC AUTO-ENTMCNC: 34 G/DL (ref 31–36)
MCV RBC AUTO: 88 FL (ref 80–94)
PLATELET # BLD AUTO: 152 10^3/UL (ref 150–450)
POTASSIUM SERPL-SCNC: 3.9 MMOL/L (ref 3.5–5)
RBC # BLD AUTO: 5.1 10^6/UL (ref 4–5.4)
SODIUM SERPL-SCNC: 142 MMOL/L (ref 135–145)
WBC # BLD AUTO: 7.4 10^3/UL (ref 3.5–10.8)

## 2019-02-28 RX ADMIN — LORAZEPAM PRN MG: 2 INJECTION INTRAMUSCULAR; INTRAVENOUS at 12:10

## 2019-02-28 RX ADMIN — DEXMEDETOMIDINE HYDROCHLORIDE SCH: 100 INJECTION, SOLUTION, CONCENTRATE INTRAVENOUS at 12:29

## 2019-02-28 RX ADMIN — AMPICILLIN SODIUM SCH MLS/HR: 2 INJECTION, POWDER, FOR SOLUTION INTRAVENOUS at 02:19

## 2019-02-28 RX ADMIN — ACYCLOVIR SODIUM SCH: 500 INJECTION, SOLUTION INTRAVENOUS at 10:42

## 2019-02-28 RX ADMIN — DEXMEDETOMIDINE HYDROCHLORIDE SCH MLS/HR: 100 INJECTION, SOLUTION, CONCENTRATE INTRAVENOUS at 19:48

## 2019-02-28 RX ADMIN — METOPROLOL TARTRATE SCH MG: 5 INJECTION, SOLUTION INTRAVENOUS at 03:05

## 2019-02-28 RX ADMIN — SODIUM CHLORIDE SCH MLS/HR: 900 IRRIGANT IRRIGATION at 01:18

## 2019-02-28 RX ADMIN — INSULIN LISPRO SCH UNITS: 100 INJECTION, SOLUTION INTRAVENOUS; SUBCUTANEOUS at 06:45

## 2019-02-28 RX ADMIN — ACYCLOVIR SODIUM SCH MLS/HR: 500 INJECTION, SOLUTION INTRAVENOUS at 10:42

## 2019-02-28 RX ADMIN — LORAZEPAM PRN MG: 2 INJECTION INTRAMUSCULAR; INTRAVENOUS at 19:49

## 2019-02-28 RX ADMIN — DEXMEDETOMIDINE HYDROCHLORIDE SCH MLS/HR: 100 INJECTION, SOLUTION, CONCENTRATE INTRAVENOUS at 01:24

## 2019-02-28 RX ADMIN — METOPROLOL TARTRATE SCH MG: 5 INJECTION, SOLUTION INTRAVENOUS at 20:23

## 2019-02-28 RX ADMIN — SODIUM CHLORIDE SCH MLS/HR: 900 IRRIGANT IRRIGATION at 13:47

## 2019-02-28 RX ADMIN — INSULIN LISPRO SCH UNITS: 100 INJECTION, SOLUTION INTRAVENOUS; SUBCUTANEOUS at 19:10

## 2019-02-28 RX ADMIN — INSULIN GLARGINE SCH UNITS: 100 INJECTION, SOLUTION SUBCUTANEOUS at 08:54

## 2019-02-28 RX ADMIN — LEVOTHYROXINE SODIUM ANHYDROUS SCH MCG: 100 INJECTION, POWDER, LYOPHILIZED, FOR SOLUTION INTRAVENOUS at 06:45

## 2019-02-28 RX ADMIN — METOPROLOL TARTRATE SCH MG: 5 INJECTION, SOLUTION INTRAVENOUS at 16:39

## 2019-02-28 RX ADMIN — METOPROLOL TARTRATE SCH MG: 5 INJECTION, SOLUTION INTRAVENOUS at 08:58

## 2019-02-28 RX ADMIN — ENOXAPARIN SODIUM SCH MG: 40 INJECTION SUBCUTANEOUS at 19:10

## 2019-02-28 RX ADMIN — DEXMEDETOMIDINE HYDROCHLORIDE SCH MLS/HR: 100 INJECTION, SOLUTION, CONCENTRATE INTRAVENOUS at 09:41

## 2019-02-28 RX ADMIN — HYDRALAZINE HYDROCHLORIDE PRN MG: 20 INJECTION INTRAMUSCULAR; INTRAVENOUS at 19:49

## 2019-02-28 RX ADMIN — INSULIN LISPRO SCH UNITS: 100 INJECTION, SOLUTION INTRAVENOUS; SUBCUTANEOUS at 12:28

## 2019-02-28 RX ADMIN — ACYCLOVIR SODIUM SCH MLS/HR: 500 INJECTION, SOLUTION INTRAVENOUS at 23:34

## 2019-02-28 RX ADMIN — AMPICILLIN SODIUM SCH MLS/HR: 2 INJECTION, POWDER, FOR SOLUTION INTRAVENOUS at 06:45

## 2019-02-28 NOTE — PN
*** AMENDED REPORT NOW INCLUDES  DATE OF FOLLOWUP ***



NEUROLOGICAL FOLLOWUP:

 

DATE OF FOLLOWUP:  02/28/19



PATIENT OF:  Dr. Tenorio.

 

HISTORY:  This is a 73-year-old man I am following for his encephalopathy and 
aphasia with a lymphocytic meningoencephalitis.  He has been on Precedex for 
agitation since last night.  I have spoken to the nurse, who notes that 
depending on the dose of the drip, he will open his eyes and say his name and a 
couple of words, but they are keeping him sedated for agitation.

 

MEDICATIONS:  Include:

1.  Metoprolol 5 mg q.6.

2.  Lorazepam p.r.n. anxiety.

3.  Synthroid 37.5 IV.

4.  Insulin regimen.

5.  Ampicillin.

6.  Acyclovir.

7.  Hydralazine 5 mg IV q.6 p.r.n.

8.  Lovenox subcu 40 mg q.24 hours.

 

PHYSICAL EXAMINATION:  Temperature 96.8, pulse 66, respirations 20, blood 
pressure 141/87.  He was somnolent and would stir and briefly move both sides 
with noxious stimulation.  He is on his Precedex currently.  Chest:  Clear.  
Cardiovascular: Regular rate and rhythm.  Abdomen:  Soft.

 

DIAGNOSTIC STUDIES:  He has no further testing back from his CSF at this point. 
His influenza serologies are negative.  His chemistries today show bump in BUN 
and creatinine to 39 and 2.53, glucose 268.  White count is down.  It had been 
as high as 16 yesterday; now, it is 7.4 with normal CBC.

 

I had discussed Mr. Nelson's case in relative detail yesterday with Dr. Steinberg 
prior to his seeing Mr. Nelson.  He told me that the ampicillin and acyclovir 
were appropriate, but he is not in yet and it is unclear if he will be able to 
see today.  If he will not, I will call him or call ID for more detailed phone 
consultation.  Depending on what ID says and what further tests they order, I 
might add an autoimmune encephalitis panel and depending on how Mr. Nelson does, 
we may consider further treatment along those lines.

 

Thank you for sharing his case.

 

 [id number]/[facility] #: [id number]

[facility]

## 2019-02-28 NOTE — PN
Subjective


Date of Service: 02/28/19


Interval History: 





Pt is sleeping on a precedex drip. Patient wake the patient at this time. 





Objective


Active Medications: 








Dextrose (D50w Syringe 50 Ml*)  12.5 gm IV PUSH .FOR FS < 60 - SS PRN


   PRN Reason: FS < 60


Enoxaparin Sodium (Lovenox(*))  40 mg SUBCUT Q24H [facility]


   Last Admin: 02/27/19 17:30 Dose:  40 mg


Hydralazine HCl (Apresoline Iv*)  5 mg IV SLOW PU Q6H PRN


   PRN Reason: Systolic Bp Greater Than:190


   Last Admin: 02/27/19 14:39 Dose:  5 mg


Sodium Chloride (Ns 0.9% 1000 Ml**)  1,000 mls @ 125 mls/hr IV PER RATE [facility]


   Last Admin: 02/28/19 01:18 Dose:  125 mls/hr


Dexmedetomidine HCl 800 mcg/ (Sodium Chloride)  200 mls @ 28.9 mls/hr IVPB Q6H 

[facility]; Protocol


   Last Admin: 02/28/19 01:24 Dose:  28.8 mls/hr


Acyclovir Sodium 900 mg/ (Sodium Chloride)  268 mls @ 268 mls/hr IVPB Q12H [facility]


Ampicillin Sodium 2 gm/ Sodium (Chloride)  100 mls @ 200 mls/hr IVPB Q6H [facility]


Insulin Glargine (Lantus(*))  5 units SUBCUT Q24H [facility]


   Last Admin: 02/27/19 09:13 Dose:  5 units


Insulin Human Lispro (Humalog*)  0 units SUBCUT Q6HR [facility]; Protocol


   Last Admin: 02/28/19 06:45 Dose:  3 units


Levothyroxine Sodium (Synthroid Inj*)  37.5 mcg IV 0600 [facility]


   Last Admin: 02/28/19 06:45 Dose:  37.5 mcg


Lorazepam (Ativan Inj*)  2 mg IV PUSH Q4H PRN


   PRN Reason: ANXIETY


   Last Admin: 02/27/19 21:49 Dose:  2 mg


Metoprolol Tartrate (Lopressor Iv*)  5 mg IV Q6H [facility]


   Last Admin: 02/28/19 03:05 Dose:  5 mg








 Vital Signs - 8 hr











  02/28/19 02/28/19 02/28/19





  01:00 01:01 01:30


 


Temperature   


 


Pulse Rate 69 73 69


 


Respiratory 27 16 25





Rate   


 


Blood Pressure  120/78 123/80





(mmHg)   


 


O2 Sat by Pulse 88 92 91





Oximetry   














  02/28/19 02/28/19 02/28/19





  02:00 02:01 02:30


 


Temperature   


 


Pulse Rate 69 68 69


 


Respiratory 18 21 16





Rate   


 


Blood Pressure 116/73  133/76





(mmHg)   


 


O2 Sat by Pulse 93 93 92





Oximetry   














  02/28/19 02/28/19 02/28/19





  03:00 03:01 03:30


 


Temperature   


 


Pulse Rate 69 65 66


 


Respiratory 20 23 20





Rate   


 


Blood Pressure 114/76  115/79





(mmHg)   


 


O2 Sat by Pulse 90 93 93





Oximetry   














  02/28/19 02/28/19 02/28/19





  03:55 04:00 04:01


 


Temperature  97.3 F 


 


Pulse Rate  65 65


 


Respiratory 20 21 21





Rate   


 


Blood Pressure  147/87 





(mmHg)   


 


O2 Sat by Pulse  94 94





Oximetry   














  02/28/19 02/28/19 02/28/19





  04:30 05:00 06:00


 


Temperature   


 


Pulse Rate 65 65 66


 


Respiratory 20 17 20





Rate   


 


Blood Pressure 141/87  





(mmHg)   


 


O2 Sat by Pulse 93 95 95





Oximetry   














  02/28/19





  08:00


 


Temperature 96.8 F


 


Pulse Rate 


 


Respiratory 





Rate 


 


Blood Pressure 





(mmHg) 


 


O2 Sat by Pulse 





Oximetry 











Oxygen Devices in Use Now: Nasal Cannula


Appearance: Elderly obese male lying in bed, NAD


Eyes: No Scleral Icterus


Ears/Nose/Mouth/Throat: Mucous Membranes Moist


Respiratory: Symmetrical Chest Expansion and Respiratory Effort, Clear to 

Auscultation - anteriorly


Cardiovascular: NL Sounds; No Murmurs; No JVD, RRR, No Edema


Abdominal: NL Sounds; No Tenderness; No Distention


Extremities: No Clubbing, Cyanosis


Skin: No Nodules or Sclerosis


Neurological: - - sleeping soundly, does not rouse to voice or light touch


Result Diagrams: 


 02/28/19 05:10





 02/28/19 05:10


Microbiology and Other Data: 


 Microbiology











 02/26/19 17:40 CSF Gram Stain (Tube 3) - Final





 Cerebral Spinal Fluid 


 


 02/26/19 19:50 Nasal Screen MRSA (PCR) - Final





 Nasal    Mrsa Not Detected














Assess/Plan/Problems-Billing


Mr Nelson is a 74 yo M who has a h/o DM, HTN, morbid obesity and CAD who 

presented to the ER after he was noted to fall a couple times but was also 

aphasic and subsequently found to be weak on the left. 





- Patient Problems


(1) Altered mental status


Current Visit: Yes   Status: Acute   Code(s): R41.82 - ALTERED MENTAL STATUS, 

UNSPECIFIED   SNOMED Code(s): 195425328


   Comment: The patient is sleeping soundly on a precedex drip at this time. He 

does not wake up for me to see how his mental state is today. At this time I am 

most suspicious for a viral meningoencephalitis. ID consult requested. Will 

continue acyclovir for now (dose reduced due to new renal failure). Ampicillin 

continues but I doubt this needs to be continued as there are no clear signs of 

bacterial meningitis. EEG slowed but no epileptiform discharges. No evidence of 

CVA.   





(2) Aphasia


Current Visit: Yes   Status: Acute   Code(s): R47.01 - APHASIA   SNOMED Code(s)

: 43580010


   Comment: Will monitor mental status/aphasia, wean precedex some to see if pt 

is improved at all.     





(3) ARF (acute renal failure)


Current Visit: Yes   Status: Acute   Comment: The patient's creatinine is now 

up to 2.53 from 0.96 on admission. A maldonado was placed last night after a 

bladder scan revealed urinary retention after an incontinent episode. Will 

continue IVF, follow BMP. Renal ultrasound done but report pending. ? post 

renal renal failure vs ATN from prolonged pre-renal state.    





(4) HTN (hypertension)


Current Visit: Yes   Status: Acute   Code(s): I10 - ESSENTIAL (PRIMARY) 

HYPERTENSION   SNOMED Code(s): 33772468


   Comment: BP improved on precedex drip and scheduled IV metoprolol. Continue 

to monitor BP.   





(5) Type II diabetes mellitus


Current Visit: Yes   Status: Acute   Comment: Sugars remain moderately 

elevated. Will increase lantus to 8 units SQ daily and continue q6hr lispro 

sliding scale.    





(6) CAD (coronary artery disease)


Current Visit: Yes   Status: Acute   Code(s): I25.10 - ATHSCL HEART DISEASE OF 

NATIVE CORONARY ARTERY W/O ANG PCTRS   SNOMED Code(s): 06499029


   Comment: No signs of ACS. Continue metoprolol. If pt remains unable to eat/

drink will need to reconsider placing NG tube (was tried earlier in the 

admission but was unable to be placed).   





(7) DVT prophylaxis


Current Visit: Yes   Status: Acute   Code(s): MUW1171 -    SNOMED Code(s): 

359708882


   Comment: lovenox   





(8) Full code status


Current Visit: Yes   Status: Acute   Code(s): Z78.9 - OTHER SPECIFIED HEALTH 

STATUS   SNOMED Code(s): 910327686

## 2019-03-01 LAB
ALBUMIN SERPL BCG-MCNC: 3.7 G/DL (ref 3.2–5.2)
ALBUMIN/GLOB SERPL: 1.2 {RATIO} (ref 1–3)
ALP SERPL-CCNC: 69 U/L (ref 34–104)
ALT SERPL W P-5'-P-CCNC: 15 U/L (ref 7–52)
ANION GAP SERPL CALC-SCNC: 11 MMOL/L (ref 2–11)
AST SERPL-CCNC: 27 U/L (ref 13–39)
BASOPHILS # BLD AUTO: 0 10^3/UL (ref 0–0.2)
BUN SERPL-MCNC: 39 MG/DL (ref 6–24)
BUN/CREAT SERPL: 13.5 (ref 8–20)
CALCIUM SERPL-MCNC: 9 MG/DL (ref 8.6–10.3)
CHLORIDE SERPL-SCNC: 116 MMOL/L (ref 101–111)
CREAT UR-MCNC: 54.96 MG/DL
EOSINOPHIL # BLD AUTO: 0 10^3/UL (ref 0–0.6)
GLOBULIN SER CALC-MCNC: 3.1 G/DL (ref 2–4)
GLUCOSE SERPL-MCNC: 218 MG/DL (ref 70–100)
HCO3 SERPL-SCNC: 21 MMOL/L (ref 22–32)
HCT VFR BLD AUTO: 46 % (ref 42–52)
HGB BLD-MCNC: 15.3 G/DL (ref 14–18)
LYMPHOCYTES # BLD AUTO: 1.5 10^3/UL (ref 1–4.8)
MAGNESIUM SERPL-MCNC: 2 MG/DL (ref 1.9–2.7)
MCH RBC QN AUTO: 29 PG (ref 27–31)
MCHC RBC AUTO-ENTMCNC: 33 G/DL (ref 31–36)
MCV RBC AUTO: 88 FL (ref 80–94)
MONOCYTES # BLD AUTO: 0.8 10^3/UL (ref 0–0.8)
NEUTROPHILS # BLD AUTO: 4.6 10^3/UL (ref 1.5–7.7)
NRBC # BLD AUTO: 0 10^3/UL
NRBC BLD QL AUTO: 0.1
PLATELET # BLD AUTO: 154 10^3/UL (ref 150–450)
POTASSIUM SERPL-SCNC: 3.6 MMOL/L (ref 3.5–5)
PROT SERPL-MCNC: 6.8 G/DL (ref 6.4–8.9)
RBC # BLD AUTO: 5.21 10^6/UL (ref 4–5.4)
RBC UR QL AUTO: (no result)
SODIUM SERPL-SCNC: 148 MMOL/L (ref 135–145)
SODIUM UR-SCNC: 134 MMOL/L
UUN UR-MCNC: 386 MG/DL
WBC # BLD AUTO: 7 10^3/UL (ref 3.5–10.8)
WBC UR QL AUTO: (no result)

## 2019-03-01 RX ADMIN — METOPROLOL TARTRATE SCH MG: 5 INJECTION, SOLUTION INTRAVENOUS at 05:05

## 2019-03-01 RX ADMIN — DEXMEDETOMIDINE HYDROCHLORIDE SCH MLS/HR: 100 INJECTION, SOLUTION, CONCENTRATE INTRAVENOUS at 01:55

## 2019-03-01 RX ADMIN — ACYCLOVIR SODIUM SCH MLS/HR: 500 INJECTION, SOLUTION INTRAVENOUS at 11:01

## 2019-03-01 RX ADMIN — METOPROLOL TARTRATE SCH MG: 5 INJECTION, SOLUTION INTRAVENOUS at 22:42

## 2019-03-01 RX ADMIN — DEXMEDETOMIDINE HYDROCHLORIDE SCH: 100 INJECTION, SOLUTION, CONCENTRATE INTRAVENOUS at 22:42

## 2019-03-01 RX ADMIN — SODIUM CHLORIDE SCH MLS/HR: 900 IRRIGANT IRRIGATION at 06:25

## 2019-03-01 RX ADMIN — LEVOTHYROXINE SODIUM ANHYDROUS SCH MCG: 100 INJECTION, POWDER, LYOPHILIZED, FOR SOLUTION INTRAVENOUS at 06:24

## 2019-03-01 RX ADMIN — HYDRALAZINE HYDROCHLORIDE PRN MG: 20 INJECTION INTRAMUSCULAR; INTRAVENOUS at 18:41

## 2019-03-01 RX ADMIN — METOPROLOL TARTRATE SCH MG: 5 INJECTION, SOLUTION INTRAVENOUS at 10:00

## 2019-03-01 RX ADMIN — ENOXAPARIN SODIUM SCH MG: 30 INJECTION SUBCUTANEOUS at 18:14

## 2019-03-01 RX ADMIN — INSULIN GLARGINE SCH UNITS: 100 INJECTION, SOLUTION SUBCUTANEOUS at 09:58

## 2019-03-01 RX ADMIN — INSULIN LISPRO SCH UNITS: 100 INJECTION, SOLUTION INTRAVENOUS; SUBCUTANEOUS at 06:23

## 2019-03-01 RX ADMIN — DEXMEDETOMIDINE HYDROCHLORIDE SCH MLS/HR: 100 INJECTION, SOLUTION, CONCENTRATE INTRAVENOUS at 19:41

## 2019-03-01 RX ADMIN — DEXMEDETOMIDINE HYDROCHLORIDE SCH MLS/HR: 100 INJECTION, SOLUTION, CONCENTRATE INTRAVENOUS at 13:47

## 2019-03-01 RX ADMIN — FENTANYL CITRATE PRN MCG: 0.05 INJECTION, SOLUTION INTRAMUSCULAR; INTRAVENOUS at 11:01

## 2019-03-01 RX ADMIN — METOPROLOL TARTRATE SCH MG: 5 INJECTION, SOLUTION INTRAVENOUS at 15:45

## 2019-03-01 RX ADMIN — ACETAMINOPHEN PRN MG: 650 SUPPOSITORY RECTAL at 15:45

## 2019-03-01 RX ADMIN — INSULIN LISPRO SCH UNITS: 100 INJECTION, SOLUTION INTRAVENOUS; SUBCUTANEOUS at 00:05

## 2019-03-01 RX ADMIN — DEXMEDETOMIDINE HYDROCHLORIDE SCH MLS/HR: 100 INJECTION, SOLUTION, CONCENTRATE INTRAVENOUS at 08:04

## 2019-03-01 RX ADMIN — FENTANYL CITRATE PRN MCG: 0.05 INJECTION, SOLUTION INTRAMUSCULAR; INTRAVENOUS at 23:26

## 2019-03-01 RX ADMIN — INSULIN LISPRO SCH UNITS: 100 INJECTION, SOLUTION INTRAVENOUS; SUBCUTANEOUS at 18:13

## 2019-03-01 RX ADMIN — FENTANYL CITRATE PRN MCG: 0.05 INJECTION, SOLUTION INTRAMUSCULAR; INTRAVENOUS at 16:17

## 2019-03-01 RX ADMIN — DEXTROSE MONOHYDRATE AND SODIUM CHLORIDE SCH MLS/HR: 5; .45 INJECTION, SOLUTION INTRAVENOUS at 15:40

## 2019-03-01 RX ADMIN — INSULIN LISPRO SCH: 100 INJECTION, SOLUTION INTRAVENOUS; SUBCUTANEOUS at 13:46

## 2019-03-01 NOTE — HP
HISTORY AND PHYSICAL:

 

DATE OF ADMISSION:

 

CHIEF COMPLAINT:  Altered mental status.

 

HISTORY OF PRESENT ILLNESS:  The patient is a 73-year-old gentleman with a past 
medical history of hypertension, coronary artery disease, type 2 diabetes, and 
hypercholesterolemia.  He presented to the emergency department on 02/26/19 
with altered mentation.  He subsequently was evaluated and seen by the 
hospitalist service and the neurology service.  He was transferred to the ICU 
for ongoing management of agitation and altered mentation.  During the hospital 
course, the patient has undergone evaluation for altered mentation and it was 
thought that most likely cause of his altered mentation is viral encephalitis.  
He is currently being treated for the encephalitis with acyclovir.  At the time 
of my evaluation, I found the patient who was responsive with verbal gargle, 
and then moving all 4 extremities.  He did not provide any history.  He has 
been on Precedex infusion, then has received intermittent fentanyl.

 

MEDICATIONS:  His medications consisted of:

 

1.  Metoprolol.

2.  Lopressor.

3.  Synthroid.

4.  Insulin.

5.  Ampicillin.

6.  Currently, receiving acyclovir.

7.  Hydralazine.

8.  Lovenox.

 

                               PHYSICAL EXAMINATION

 

GENERAL:  I found an obese, elderly male, who was unresponsive appropriately to 
verbal commands.  He did open his eyes intermittently and did grunt 
intermittently. He did not appear any respiratory distress or pain.

 

VITAL SIGNS:  His temperature was 98.4, his pulse was 73, his respiratory rate 
was 22, oxygen saturation was 96% on room air, blood pressure 157/77, and mean 
blood pressure was 99.

 

HEENT:  Mucous membranes pink and moist, anicteric.  Oral mucosa, there was no 
erythema.

 

NECK:  Supple.

 

LUNGS:  Clear to auscultation.

 

CARDIOVASCULAR:  S1, S2 regular.

 

ABDOMEN:  Soft, nontender.

 

EXTREMITIES:  There was no edema, cyanosis or clubbing noted.

 

NEUROLOGIC:  He was moving all 4 extremities; however, it was unpurposeful, not 
following any commands.

 

 DIAGNOSTIC STUDIES/LAB DATA:  Laboratory workup was reviewed, which consisted 
of white count of 7.0, hemoglobin of 17.3, hematocrit of 46, platelets of 154, 
and sodium 148, potassium 3.6, chloride of 116, bicarb of 21, BUN of 11, 
creatinine of 2.88, which has increased from his baseline.  His blood glucose 
has been in the 200s, calcium 9.0, phosphorous 4.2, mag of 2.0.  LFTs are 
within normal limits. Albumin is 3.7.  Blood gas completed on 02/26/19 revealed 
a pH of 7.44, pCO2 of 29, pO2 of 83, saturating 96%.  Urinalysis was 1+ for wbc 
and 3+ for glucose. Microbiology cultures including cultures, blood, and CSF 
have shown no growth.

 

CSF analysis revealed a total WBC count of 16 with lymphocytic predominance, 
glucose of 150, and a protein of 53.  VDRL was negative, crypto antigen negative
, HSV1, PCR negative, herpes 2 DNA negative so far.

 

Imaging studies:  Renal ultrasound completed on 02/28/19 did not reveal any 
hydronephrosis, findings consistent with fatty liver.  The patient has also had 
an MRI of the brain with no acute pathology.  Chest x-ray completed on 02/26/19 
revealed a tortuous aorta with widening mediastinum, progressed when compared 
to x- ray on 04/15, consider further evaluation with a CT angio.  Transthoracic 
echo was also done.  Findings were consistent with ejection fraction of 50%.  
Overall, the study was poor because of movement.

 

IMPRESSION AND PLAN:

1.  Altered mental status likely related to viral encephalitis, currently being 
treated with acyclovir.  In addition, the patient is requiring Precedex 
infusion for agitation and sedation.  He is also receiving fentanyl p.r.n. for 
increased pain and agitation.  Would continue the current management.  I have 
discussed the findings with the neurologist, who agrees that will continue 
current management for his altered mentation since the CSF analysis did not 
reveal any other source of pathology.



2.  Elevated creatinine possibly prerenal.  I would recommend IV fluids to be 
initiated and monitoring urine output closely with a Dunlap catheter.

I have asked the pharmacist to adjust this current medication to reflect patient
's current renal function.



3.  Tortuous aorta on chest x-ray.  It would be difficult to do a CT scan with 
contrast with the patient given his current state, however, repeat chest x-ray 
should be considered for further evaluation.  We will continue to follow in the 
ICU.

 

 

 

[id number]/[facility] #: [id number]

[facility]U

## 2019-03-01 NOTE — PN
NEUROLOGICAL FOLLOWUP:



DATE:  03/01/19

 

PATIENT OF:  The hospitalist.

 

HISTORY OF PRESENT ILLNESS:  This is a 73-year-old man with meningoencephalitis
, being sedated with fentanyl p.r.n.

 

MEDICATIONS:  His medicines include:

1.  Metoprolol.

2.  Lorazepam for anxiety.

3.  Synthroid.

4.  Insulin, unchanged.

5.  Hydralazine p.r.n.

6.  His fentanyl as mentioned.

7.  Lovenox 40 mg subcu.

8.  Dexmedetomidine drip.

9.  Acyclovir.

 

PHYSICAL EXAMINATION:  On exam, temperature 98.4, pulse 73, respirations 22, 
blood pressure 157/77.  He was sedated, but would awake to mildly noxious stim.
  He would curse and then he would mumble 4 or 5 words.  He moves all 
extremities with power and there was no obvious facial asymmetry.  Chest clear.
  Cardiovascular:  Regular rate and rhythm.  Abdomen was soft.

 

DIAGNOSTIC STUDIES/LAB DATA:  His CBC was normal, down from a white count of 
16. His herpes PCR, cryptococcal antigen, and VDRL are all negative as of today.

 

ASSESSMENT AND PLAN:  I had spoken to Dr. Tenorio yesterday who had informed me 
that Dr. Steinberg had seen this patient in detail and had recommended 
continuing the acyclovir for now and this is before the titers came back and 
that we will not treat with steroids or any other treatment for paraneoplastic 
syndrome until we are sure what the herpes PCR was.

 

Clinically, the patient has improved and has more speech output, but is still 
significantly affected.  I am going to speak to him in terms of whether he 
feels comfortable discontinuing the acyclovir, these were cases where you could 
have negative herpes PCR and still conceivably have herpes and also I will 
discuss with him and Dr. Vyas, the neurologist who is coming on, at what 
point we would begin treatment for paraneoplastic syndrome.  I think if he 
continues to improve, this may be more likely viral.  The improvement is subtle
, but I think is definite and the ICU attending notes that he has been less 
agitated and speaking more while he has been sedating him and notes the 
improvement as well.

 

Thank you for sharing his case.

 

 [id number]/CPS #: [id number]

ANALIA

## 2019-03-01 NOTE — PN
Subjective


Date of Service: 03/01/19


Interval History: 





Pt seen and examined.  Meds and labs reviewed.  





CC: N/A





ROS:  Could not be reliably obtained





PHYSICAL EXAM:


GEN APPEARANCE: Sedated, not in acute distress, obese


HEENT: NC/AT, PERRLA, moist oral mucosa, (-) throat erythema


NECK: Soft, supple, (-) cervical LAD, (-)JVD


HEART: S1S2 WNL, RRR, No MRG


CHEST: CTA, BL, GAE, No W/R/R


ABD: Soft, ND/NT, NABS 4x Q


EXT: No C/C/E


SKIN: Warm to touch


PSYCH: No active psychosis, hallucinations, depression, SI/HI





Objective


Active Medications: 








Acetaminophen (Tylenol Supp*)  650 mg HI Q6H PRN


   PRN Reason: Pain/Fever


   Last Admin: 03/01/19 15:45 Dose:  650 mg


Dextrose (D50w Syringe 50 Ml*)  12.5 gm IV PUSH .FOR FS < 60 - SS PRN


   PRN Reason: FS < 60


Enoxaparin Sodium (Lovenox(*))  30 mg SUBCUT Q24H [facility]


   Last Admin: 03/01/19 18:14 Dose:  30 mg


Fentanyl Citrate (Fentanyl*)  50 mcg IV SLOW PU Q4H PRN


   PRN Reason: Agitation


   Last Admin: 03/01/19 16:17 Dose:  50 mcg


Hydralazine HCl (Apresoline Iv*)  5 mg IV SLOW PU Q6H PRN


   PRN Reason: Systolic Bp Greater Than:190


   Last Admin: 03/01/19 18:41 Dose:  5 mg


Dexmedetomidine HCl 800 mcg/ (Sodium Chloride)  200 mls @ 39.73 mls/hr IVPB Q5H 

[facility]; Protocol


   Last Admin: 03/01/19 19:41 Dose:  39.73 mls/hr


Dextrose/Sodium Chloride (D5w 1/2 Ns 1000 Ml Bag*)  1,000 mls @ 75 mls/hr IV 

PER RATE [facility]


   Stop: 03/04/19 05:19


   Last Admin: 03/01/19 15:40 Dose:  75 mls/hr


Acyclovir Sodium 900 mg/ (Sodium Chloride)  268 mls @ 268 mls/hr IVPB Q24H [facility]


Insulin Glargine (Lantus(*))  5 units SUBCUT Q24H [facility]


   Last Admin: 03/01/19 09:58 Dose:  5 units


Insulin Human Lispro (Humalog*)  0 units SUBCUT Q6HR [facility]; Protocol


   Last Admin: 03/01/19 18:13 Dose:  2 units


Levothyroxine Sodium (Synthroid Inj*)  37.5 mcg IV 0600 [facility]


   Last Admin: 03/01/19 06:24 Dose:  37.5 mcg


Lorazepam (Ativan Inj*)  2 mg IV PUSH Q4H PRN


   PRN Reason: ANXIETY


   Last Admin: 02/28/19 19:49 Dose:  2 mg


Metoprolol Tartrate (Lopressor Iv*)  5 mg IV Q6H [facility]


   Last Admin: 03/01/19 15:45 Dose:  5 mg








 Vital Signs - 8 hr











  03/01/19 03/01/19 03/01/19





  12:00 12:20 12:30


 


Temperature 99.1 F  


 


Pulse Rate 72  73


 


Respiratory  22 





Rate   


 


Blood Pressure 157/77  145/80





(mmHg)   


 


O2 Sat by Pulse 96  95





Oximetry   














  03/01/19 03/01/19 03/01/19





  13:00 13:01 13:30


 


Temperature  100.6 F 100.8 F


 


Pulse Rate  88 86


 


Respiratory 20 17 





Rate   


 


Blood Pressure  185/97 185/95





(mmHg)   


 


O2 Sat by Pulse  97 93





Oximetry   














  03/01/19 03/01/19 03/01/19





  14:00 14:01 14:30


 


Temperature  100.9 F 101.3 F


 


Pulse Rate  89 83


 


Respiratory 20 33 10





Rate   


 


Blood Pressure  166/88 192/128





(mmHg)   


 


O2 Sat by Pulse  94 96





Oximetry   














  03/01/19 03/01/19 03/01/19





  15:00 15:30 16:00


 


Temperature 101.5 F 101.3 F 101.5 F


 


Pulse Rate 82 93 


 


Respiratory 18 25 





Rate   


 


Blood Pressure 206/117 175/92 185/109





(mmHg)   


 


O2 Sat by Pulse 96 96 





Oximetry   














  03/01/19 03/01/19 03/01/19





  16:10 16:17 16:31


 


Temperature   101.5 F


 


Pulse Rate   


 


Respiratory 22 20 





Rate   


 


Blood Pressure   184/91





(mmHg)   


 


O2 Sat by Pulse   





Oximetry   














  03/01/19 03/01/19 03/01/19





  17:00 17:31 18:00


 


Temperature 101.5 F 101.5 F 101.5 F


 


Pulse Rate   


 


Respiratory 15  20





Rate   


 


Blood Pressure 189/106 220/130 203/114





(mmHg)   


 


O2 Sat by Pulse   





Oximetry   














  03/01/19 03/01/19 03/01/19





  18:30 18:50 19:00


 


Temperature 101.1 F 101.1 F 101.1 F


 


Pulse Rate 71 79 80


 


Respiratory 23 21 21





Rate   


 


Blood Pressure 202/117 172/96 165/98





(mmHg)   


 


O2 Sat by Pulse 98 94 95





Oximetry   











Oxygen Devices in Use Now: Nasal Cannula


Result Diagrams: 


 03/01/19 09:15





 03/01/19 09:15


Microbiology and Other Data: 


 Microbiology











 02/26/19 17:40 CSF Gram Stain (Tube 3) - Final





 Cerebral Spinal Fluid 


 


 02/26/19 19:50 Nasal Screen MRSA (PCR) - Final





 Nasal    Mrsa Not Detected














Assess/Plan/Problems-Billing


Mr Nelson is a 72 yo M who has a h/o DM, HTN, morbid obesity and CAD who 

presented to the ER after he was noted to fall a couple times but was also 

aphasic and subsequently found to be weak on the left. 





- Patient Problems


(1) Altered mental status


Current Visit: Yes   Status: Acute   Code(s): R41.82 - ALTERED MENTAL STATUS, 

UNSPECIFIED   SNOMED Code(s): 888122428


   Comment: 


-D/W Dr. Murray


-Doing well w/ Precedex gtt in addition w/50 ug q4H PRN IV fentanyl


The patient is sleeping soundly on a precedex drip at this time. He does not 

wake up for me to see how his mental state is today. At this time I am most 

suspicious for a viral meningoencephalitis. ID consult requested. Will continue 

acyclovir for now (dose reduced due to new renal failure). Ampicillin continues 

but I doubt this needs to be continued as there are no clear signs of bacterial 

meningitis. EEG slowed but no epileptiform discharges. No evidence of CVA.      





(2) Aphasia


Current Visit: Yes   Status: Acute   Code(s): R47.01 - APHASIA   SNOMED Code(s)

: 61528552


   Comment: Will monitor mental status/aphasia, wean precedex some to see if pt 

is improved at all.     





(3) ARF (acute renal failure)


Current Visit: Yes   Status: Acute   Comment: The patient's creatinine is now 

up to 2.53 from 0.96 on admission. A maldonado was placed last night after a 

bladder scan revealed urinary retention after an incontinent episode. Will 

continue IVF, follow BMP. Renal ultrasound done but report pending. ? post 

renal renal failure vs ATN from prolonged pre-renal state.    





(4) HTN (hypertension)


Current Visit: Yes   Status: Acute   Code(s): I10 - ESSENTIAL (PRIMARY) 

HYPERTENSION   SNOMED Code(s): 62290833


   Comment: BP improved on precedex drip and scheduled IV metoprolol. Continue 

to monitor BP.   





(5) Type II diabetes mellitus


Current Visit: Yes   Status: Acute   Comment: 


-Continue current regimen; adjust for FS>180 on average   





(6) CAD (coronary artery disease)


Current Visit: Yes   Status: Acute   Code(s): I25.10 - ATHSCL HEART DISEASE OF 

NATIVE CORONARY ARTERY W/O ANG PCTRS   SNOMED Code(s): 41390998


   Comment: No signs of ACS. Continue metoprolol. If pt remains unable to eat/

drink will need to reconsider placing NG tube (was tried earlier in the 

admission but was unable to be placed).   





(7) DVT prophylaxis


Current Visit: Yes   Status: Acute   Code(s): YSW6864 -    SNOMED Code(s): 

461324630


   Comment: lovenox   


Status and Disposition: 





-As above

## 2019-03-02 LAB
ALBUMIN SERPL BCG-MCNC: 3.5 G/DL (ref 3.2–5.2)
ALBUMIN/GLOB SERPL: 1.2 {RATIO} (ref 1–3)
ALP SERPL-CCNC: 58 U/L (ref 34–104)
ALT SERPL W P-5'-P-CCNC: 15 U/L (ref 7–52)
ANION GAP SERPL CALC-SCNC: 8 MMOL/L (ref 2–11)
AST SERPL-CCNC: 31 U/L (ref 13–39)
BASOPHILS # BLD AUTO: 0 10^3/UL (ref 0–0.2)
BUN SERPL-MCNC: 38 MG/DL (ref 6–24)
BUN/CREAT SERPL: 14.1 (ref 8–20)
CALCIUM SERPL-MCNC: 8.8 MG/DL (ref 8.6–10.3)
CHLORIDE SERPL-SCNC: 118 MMOL/L (ref 101–111)
EOSINOPHIL # BLD AUTO: 0.1 10^3/UL (ref 0–0.6)
GLOBULIN SER CALC-MCNC: 3 G/DL (ref 2–4)
GLUCOSE SERPL-MCNC: 290 MG/DL (ref 70–100)
HCO3 SERPL-SCNC: 23 MMOL/L (ref 22–32)
HCT VFR BLD AUTO: 45 % (ref 42–52)
HGB BLD-MCNC: 15 G/DL (ref 14–18)
LYMPHOCYTES # BLD AUTO: 1.7 10^3/UL (ref 1–4.8)
MAGNESIUM SERPL-MCNC: 2 MG/DL (ref 1.9–2.7)
MCH RBC QN AUTO: 30 PG (ref 27–31)
MCHC RBC AUTO-ENTMCNC: 33 G/DL (ref 31–36)
MCV RBC AUTO: 89 FL (ref 80–94)
MONOCYTES # BLD AUTO: 0.8 10^3/UL (ref 0–0.8)
NEUTROPHILS # BLD AUTO: 4.5 10^3/UL (ref 1.5–7.7)
NRBC # BLD AUTO: 0 10^3/UL
NRBC BLD QL AUTO: 0
PLATELET # BLD AUTO: 136 10^3/UL (ref 150–450)
POTASSIUM SERPL-SCNC: 4.2 MMOL/L (ref 3.5–5)
PROT SERPL-MCNC: 6.5 G/DL (ref 6.4–8.9)
RBC # BLD AUTO: 5.02 10^6/UL (ref 4–5.4)
SODIUM SERPL-SCNC: 149 MMOL/L (ref 135–145)
WBC # BLD AUTO: 7.2 10^3/UL (ref 3.5–10.8)

## 2019-03-02 RX ADMIN — DEXMEDETOMIDINE HYDROCHLORIDE SCH: 100 INJECTION, SOLUTION, CONCENTRATE INTRAVENOUS at 21:20

## 2019-03-02 RX ADMIN — DEXTROSE MONOHYDRATE AND SODIUM CHLORIDE SCH MLS/HR: 5; .45 INJECTION, SOLUTION INTRAVENOUS at 04:27

## 2019-03-02 RX ADMIN — DEXMEDETOMIDINE HYDROCHLORIDE SCH MLS/HR: 100 INJECTION, SOLUTION, CONCENTRATE INTRAVENOUS at 23:01

## 2019-03-02 RX ADMIN — METOPROLOL TARTRATE SCH MG: 5 INJECTION, SOLUTION INTRAVENOUS at 20:47

## 2019-03-02 RX ADMIN — INSULIN GLARGINE SCH UNITS: 100 INJECTION, SOLUTION SUBCUTANEOUS at 08:52

## 2019-03-02 RX ADMIN — FENTANYL CITRATE PRN MCG: 0.05 INJECTION, SOLUTION INTRAMUSCULAR; INTRAVENOUS at 16:59

## 2019-03-02 RX ADMIN — LORAZEPAM PRN MG: 2 INJECTION INTRAMUSCULAR; INTRAVENOUS at 02:05

## 2019-03-02 RX ADMIN — METOPROLOL TARTRATE SCH MG: 5 INJECTION, SOLUTION INTRAVENOUS at 08:52

## 2019-03-02 RX ADMIN — CEFTRIAXONE SODIUM SCH MLS/HR: 1 INJECTION, POWDER, FOR SOLUTION INTRAVENOUS at 08:53

## 2019-03-02 RX ADMIN — METOPROLOL TARTRATE SCH MG: 5 INJECTION, SOLUTION INTRAVENOUS at 04:06

## 2019-03-02 RX ADMIN — LEVOTHYROXINE SODIUM ANHYDROUS SCH MCG: 100 INJECTION, POWDER, LYOPHILIZED, FOR SOLUTION INTRAVENOUS at 06:37

## 2019-03-02 RX ADMIN — ENOXAPARIN SODIUM SCH MG: 30 INJECTION SUBCUTANEOUS at 16:59

## 2019-03-02 RX ADMIN — LORAZEPAM PRN MG: 2 INJECTION INTRAMUSCULAR; INTRAVENOUS at 16:36

## 2019-03-02 RX ADMIN — FENTANYL CITRATE PRN MCG: 0.05 INJECTION, SOLUTION INTRAMUSCULAR; INTRAVENOUS at 23:52

## 2019-03-02 RX ADMIN — DEXMEDETOMIDINE HYDROCHLORIDE SCH MLS/HR: 100 INJECTION, SOLUTION, CONCENTRATE INTRAVENOUS at 10:19

## 2019-03-02 RX ADMIN — INSULIN LISPRO SCH UNITS: 100 INJECTION, SOLUTION INTRAVENOUS; SUBCUTANEOUS at 06:36

## 2019-03-02 RX ADMIN — DEXMEDETOMIDINE HYDROCHLORIDE SCH MLS/HR: 100 INJECTION, SOLUTION, CONCENTRATE INTRAVENOUS at 06:01

## 2019-03-02 RX ADMIN — INSULIN LISPRO SCH UNITS: 100 INJECTION, SOLUTION INTRAVENOUS; SUBCUTANEOUS at 11:43

## 2019-03-02 RX ADMIN — DEXMEDETOMIDINE HYDROCHLORIDE SCH MLS/HR: 100 INJECTION, SOLUTION, CONCENTRATE INTRAVENOUS at 16:35

## 2019-03-02 RX ADMIN — DEXMEDETOMIDINE HYDROCHLORIDE SCH MLS/HR: 100 INJECTION, SOLUTION, CONCENTRATE INTRAVENOUS at 01:19

## 2019-03-02 RX ADMIN — METOPROLOL TARTRATE SCH MG: 5 INJECTION, SOLUTION INTRAVENOUS at 15:14

## 2019-03-02 RX ADMIN — DOXYCYCLINE SCH MLS/HR: 100 INJECTION, POWDER, LYOPHILIZED, FOR SOLUTION INTRAVENOUS at 19:24

## 2019-03-02 RX ADMIN — INSULIN LISPRO SCH UNITS: 100 INJECTION, SOLUTION INTRAVENOUS; SUBCUTANEOUS at 00:30

## 2019-03-02 RX ADMIN — INSULIN LISPRO SCH UNITS: 100 INJECTION, SOLUTION INTRAVENOUS; SUBCUTANEOUS at 17:12

## 2019-03-02 RX ADMIN — INSULIN LISPRO SCH UNITS: 100 INJECTION, SOLUTION INTRAVENOUS; SUBCUTANEOUS at 23:45

## 2019-03-02 NOTE — CONS
NEUROLOGY FOLLOWUP CONSULTATION:

 

DATE OF FOLLOWUP:  03/02/19

 

HOSPITALIST:  Dr. Ratliff.

 

LOCATION:  He is in ICU bed 8.

 

CHIEF COMPLAINT:  Unresponsiveness.

 

INTERVAL HISTORY:  Since yesterday, Mr. Nelson has been intermittently agitated and has required sedati
on.  He just received some a little while ago.  There have been no new clinical events described.

 

MEDICATIONS:  Medications are reviewed and he is on:

1.  Acyclovir, which has been changed to renal dosing.

2.  Ceftriaxone 1 g IV q.24 hours.

3.  Precedex as needed.

4.  Lovenox 30 mg subcutaneous q.24 hours.

5.  Fentanyl 50 mcg IV q.4 hours as needed for agitation.

6.  Hydralazine 5 mg IV q.6 hours as needed for elevated systolic blood pressure.

7.  Insulin sliding scale.

8.  Levothyroxine 37.5 mcg IV daily.

9.  Lorazepam 2 mg IV q.4 hours as needed for anxiety.

10.  Metoprolol 5 mg IV q.6 hours.

 

PHYSICAL EXAM:  On physical examination, he is morbidly obese.  The most recent temperature is 100.8 
by Dunlap probe, blood pressure 160/102, heart rate in the 80s, respiratory rate is 29 and oxygen satu
ration is 93% on supplemental oxygen.

 

He is very somnolent and I could not arouse him to voice.  He is restless and agitated when stimulate
d and moves all 4 extremities vigorously.  When I open his eyes, his pupils are reactive to light fro
m 3 down to 2 mm.  He responds to nasal tickle symmetrically on the face with symmetrical facial move
ments.  He does not verbalize.  He has bilateral Babinski signs.

 

DIAGNOSTIC STUDIES/LAB DATA:  Laboratory data is reviewed.  CBC today notable for normalization of hi
s white blood cell count to 7.2, normal hemoglobin.  Platelet count has come down during the hospital
ization to 136,000 today.  Urinalysis on 03/01/19, notable for 3+ blood, 3+ red blood cells, 1+ white
 blood cells, 2+ glucose.  HIV 1 and 2 serology is negative.  Benzodiazepines were positive on urine 
scan when he came in.  Spinal fluid results are noted.  He had 16 white blood cells with a differenti
al of 62% lymphocytes, 35% monocytes, 2% neutrophils, and 1% band neutrophils.  CSF/VDRL, cryptococca
l antigen, and herpes simplex virus 1 and 2, PCR all negative.  West Nile virus antibody studies are 
pending.  Spinal fluid culture is negative for growth on day 4.  Spinal fluid Gram stain was interrup
isidro as 2+ neutrophils and 3+ nucleated cells, but no organisms.

 

Chest x-ray revealed wide mediastinum, unchanged from the admission chest x-ray of 02/26/19.  A renal
 ultrasound on 02/28/19 interpreted as unremarkable.  EEG February 27th interpreted as diffuse slowin
g.

 

IMPRESSION:  Impression is that of persistent encephalopathy of unclear etiology. He has abnormal spi
nal fluid, but normal brain MRI.  It is hard to assess if he is doing any better as he is currently s
edated, but reading Dr. Casas's initial consultation and subsequent note, he might be a little less
 obtundent.  Again, this is a supposition and not really able to be supported by current exam.

 

His creatinine is rising quite a bit since he came in and his herpes simplex PCR is negative, so I am
 going to stop acyclovir.  I am going to put in some additional blood work to include a sedimentation
 rates, CRP, SELENE, antineutrophil antibodies, and mycoplasma antibodies.  I have also put in for Lyme 
screen and I am going to start doxycycline.  I will continue to follow him along with you.

 

 [id number]/[facility] #: [id number]

## 2019-03-02 NOTE — PN
Subjective


Date of Service: 03/02/19


Interval History: 





Pt seen and examined.  Meds and labs reviewed.  





CC: N/A





ROS:  Could not be reliably obtained





PHYSICAL EXAM:


GEN APPEARANCE: Sedated, not in acute distress, obese


HEENT: NC/AT, PERRLA, moist oral mucosa, (-) throat erythema


NECK: Soft, supple, (-) cervical LAD, (-)JVD


HEART: S1S2 WNL, RRR, No MRG


CHEST: CTA, BL, GAE, No W/R/R


ABD: Soft, ND/NT, NABS 4x Q


EXT: No C/C/E


SKIN: Warm to touch


PSYCH: No active psychosis, hallucinations, depression, SI/HI





Objective


Active Medications: 








Acetaminophen (Tylenol Supp*)  650 mg SD Q6H PRN


   PRN Reason: Pain/Fever


   Last Admin: 03/01/19 15:45 Dose:  650 mg


Dextrose (D50w Syringe 50 Ml*)  12.5 gm IV PUSH .FOR FS < 60 - SS PRN


   PRN Reason: FS < 60


Enoxaparin Sodium (Lovenox(*))  30 mg SUBCUT Q24H Replaced by [facility]


   Last Admin: 03/02/19 16:59 Dose:  30 mg


Fentanyl Citrate (Fentanyl*)  50 mcg IV SLOW PU Q4H PRN


   PRN Reason: Agitation


   Last Admin: 03/02/19 16:59 Dose:  50 mcg


Hydralazine HCl (Apresoline Iv*)  5 mg IV SLOW PU Q6H PRN


   PRN Reason: Systolic Bp Greater Than:190


   Last Admin: 03/01/19 18:41 Dose:  5 mg


Dexmedetomidine HCl 800 mcg/ (Sodium Chloride)  200 mls @ 50.57 mls/hr IVPB Q4H 

Replaced by [facility]; Protocol


   Last Admin: 03/02/19 16:35 Dose:  78.3 mls/hr


Ceftriaxone Sodium 1 gm/ (Sodium Chloride)  50 mls @ 200 mls/hr IVPB Q24H Replaced by [facility]


   Last Admin: 03/02/19 08:53 Dose:  200 mls/hr


Dextrose (D5w 1000 Ml Bag*)  1,000 mls @ 75 mls/hr IV PER RATE [facility]


   Stop: 03/03/19 22:19


   Last Admin: 03/02/19 08:56 Dose:  75 mls/hr


Doxycycline Hyclate 100 mg/ (Sodium Chloride)  250 mls @ 250 mls/hr IVPB Q12H 

Replaced by [facility]


Insulin Glargine (Lantus(*))  14 units SUBCUT Q24H Replaced by [facility]


Insulin Human Lispro (Humalog*)  0 units SUBCUT Q6HR Replaced by [facility]; Protocol


   Last Admin: 03/02/19 17:12 Dose:  3 units


Levothyroxine Sodium (Synthroid Inj*)  37.5 mcg IV 0600 Replaced by [facility]


   Last Admin: 03/02/19 06:37 Dose:  37.5 mcg


Lorazepam (Ativan Inj*)  2 mg IV PUSH Q4H PRN


   PRN Reason: ANXIETY


   Last Admin: 03/02/19 16:36 Dose:  2 mg


Metoprolol Tartrate (Lopressor Iv*)  5 mg IV Q6H Replaced by [facility]


   Last Admin: 03/02/19 15:14 Dose:  5 mg








 Vital Signs - 8 hr











  03/02/19 03/02/19 03/02/19





  11:00 12:00 13:00


 


Temperature 100.2 F 100.6 F 100.4 F


 


Pulse Rate 77 82 94


 


Respiratory 18 23 22





Rate   


 


Blood Pressure   





(mmHg)   


 


O2 Sat by Pulse 93 91 96





Oximetry   














  03/02/19 03/02/19 03/02/19





  13:03 13:30 14:00


 


Temperature 100.4 F 100.0 F 100.0 F


 


Pulse Rate 68 68 74


 


Respiratory 21 20 20





Rate   


 


Blood Pressure 168/95 162/89 165/98





(mmHg)   


 


O2 Sat by Pulse 91 93 90





Oximetry   














  03/02/19 03/02/19 03/02/19





  14:30 15:00 15:01


 


Temperature 100.0 F 100.2 F 100.2 F


 


Pulse Rate 64 78 76


 


Respiratory 17 18 23





Rate   


 


Blood Pressure 163/93 159/91 





(mmHg)   


 


O2 Sat by Pulse 88 96 93





Oximetry   














  03/02/19 03/02/19 03/02/19





  15:30 16:00 16:01


 


Temperature 100.4 F 100.8 F 100.8 F


 


Pulse Rate 79 86 87


 


Respiratory 18 15 29





Rate   


 


Blood Pressure 161/100 160/102 





(mmHg)   


 


O2 Sat by Pulse 87 94 93





Oximetry   














  03/02/19 03/02/19





  16:36 16:59


 


Temperature  


 


Pulse Rate  


 


Respiratory 24 24





Rate  


 


Blood Pressure  





(mmHg)  


 


O2 Sat by Pulse  





Oximetry  











Oxygen Devices in Use Now: Nasal Cannula


Result Diagrams: 


 03/02/19 05:00





 03/02/19 14:20


Microbiology and Other Data: 


 Microbiology











 02/26/19 17:40 CSF Gram Stain (Tube 3) - Final





 Cerebral Spinal Fluid 


 


 02/26/19 19:50 Nasal Screen MRSA (PCR) - Final





 Nasal    Mrsa Not Detected














Assess/Plan/Problems-Billing


Mr Nelson is a 74 yo M who has a h/o DM, HTN, morbid obesity and CAD who 

presented to the ER after he was noted to fall a couple times but was also 

aphasic and subsequently found to be weak on the left. 





- Patient Problems


(1) Altered mental status


Current Visit: Yes   Status: Acute   Code(s): R41.82 - ALTERED MENTAL STATUS, 

UNSPECIFIED   SNOMED Code(s): 029234067


   Comment: 


-D/W Dr. Murray


-Doing well w/ Precedex gtt in addition w/50 ug q4H PRN IV fentanyl


-Likely due to viral meningoencephalitis


-ID Consult pending although previously discussed w/Dr. Steinberg; will touch 

base on Monday   





(2) Aphasia


Current Visit: Yes   Status: Acute   Code(s): R47.01 - APHASIA   SNOMED Code(s)

: 30576724


   Comment: Will monitor mental status/aphasia, wean precedex some to see if pt 

is improved at all.     





(3) ARF (acute renal failure)


Current Visit: Yes   Status: Acute   Comment: 


-Likely due to ATN given FENa= 4.74% and FE-Urea =51.86%


-Slightly improved


-Continue watchful waiting   





(4) HTN (hypertension)


Current Visit: Yes   Status: Acute   Code(s): I10 - ESSENTIAL (PRIMARY) 

HYPERTENSION   SNOMED Code(s): 84093460


   Comment: BP improved on precedex drip and scheduled IV metoprolol. Continue 

to monitor BP.   





(5) Type II diabetes mellitus


Current Visit: Yes   Status: Acute   Comment: 


-Increased Lantus as ordered


-Continue to monitor   





(6) CAD (coronary artery disease)


Current Visit: Yes   Status: Acute   Code(s): I25.10 - ATHSCL HEART DISEASE OF 

NATIVE CORONARY ARTERY W/O ANG PCTRS   SNOMED Code(s): 83323393


   Comment: No signs of ACS. Continue metoprolol. If pt remains unable to eat/

drink will need to reconsider placing NG tube (was tried earlier in the 

admission but was unable to be placed).   





(7) DVT prophylaxis


Current Visit: Yes   Status: Acute   Code(s): YCI1156 -    SNOMED Code(s): 

558539929


   Comment: lovenox   


Status and Disposition: 


-As above


-Updated both his brothers this AM and was very pleased w/having their 

questions answered

## 2019-03-03 LAB
ALBUMIN SERPL BCG-MCNC: 3.6 G/DL (ref 3.2–5.2)
ALBUMIN/GLOB SERPL: 1.2 {RATIO} (ref 1–3)
ALP SERPL-CCNC: 65 U/L (ref 34–104)
ALT SERPL W P-5'-P-CCNC: 16 U/L (ref 7–52)
ANION GAP SERPL CALC-SCNC: 9 MMOL/L (ref 2–11)
AST SERPL-CCNC: 25 U/L (ref 13–39)
BUN SERPL-MCNC: 34 MG/DL (ref 6–24)
BUN/CREAT SERPL: 15 (ref 8–20)
CALCIUM SERPL-MCNC: 8.9 MG/DL (ref 8.6–10.3)
CHLORIDE SERPL-SCNC: 117 MMOL/L (ref 101–111)
GLOBULIN SER CALC-MCNC: 3 G/DL (ref 2–4)
GLUCOSE SERPL-MCNC: 279 MG/DL (ref 70–100)
HCO3 SERPL-SCNC: 23 MMOL/L (ref 22–32)
HCT VFR BLD AUTO: 47 % (ref 42–52)
HGB BLD-MCNC: 15.6 G/DL (ref 14–18)
MAGNESIUM SERPL-MCNC: 1.9 MG/DL (ref 1.9–2.7)
MCH RBC QN AUTO: 29 PG (ref 27–31)
MCHC RBC AUTO-ENTMCNC: 33 G/DL (ref 31–36)
MCV RBC AUTO: 89 FL (ref 80–94)
PLATELET # BLD AUTO: 143 10^3/UL (ref 150–450)
POTASSIUM SERPL-SCNC: 4.3 MMOL/L (ref 3.5–5)
PROT SERPL-MCNC: 6.6 G/DL (ref 6.4–8.9)
RBC # BLD AUTO: 5.31 10^6/UL (ref 4–5.4)
SODIUM SERPL-SCNC: 149 MMOL/L (ref 135–145)
WBC # BLD AUTO: 9.7 10^3/UL (ref 3.5–10.8)

## 2019-03-03 RX ADMIN — METOPROLOL TARTRATE SCH MG: 5 INJECTION, SOLUTION INTRAVENOUS at 15:18

## 2019-03-03 RX ADMIN — METHYLPREDNISOLONE SODIUM SUCCINATE SCH MLS/HR: 1 INJECTION, POWDER, FOR SOLUTION INTRAMUSCULAR; INTRAVENOUS at 13:22

## 2019-03-03 RX ADMIN — DEXMEDETOMIDINE HYDROCHLORIDE SCH: 100 INJECTION, SOLUTION, CONCENTRATE INTRAVENOUS at 21:11

## 2019-03-03 RX ADMIN — FENTANYL CITRATE PRN MCG: 0.05 INJECTION, SOLUTION INTRAMUSCULAR; INTRAVENOUS at 18:33

## 2019-03-03 RX ADMIN — INSULIN LISPRO SCH UNITS: 100 INJECTION, SOLUTION INTRAVENOUS; SUBCUTANEOUS at 12:43

## 2019-03-03 RX ADMIN — DEXMEDETOMIDINE HYDROCHLORIDE SCH MLS/HR: 100 INJECTION, SOLUTION, CONCENTRATE INTRAVENOUS at 07:16

## 2019-03-03 RX ADMIN — DEXMEDETOMIDINE HYDROCHLORIDE SCH MLS/HR: 100 INJECTION, SOLUTION, CONCENTRATE INTRAVENOUS at 19:43

## 2019-03-03 RX ADMIN — METOPROLOL TARTRATE SCH MG: 5 INJECTION, SOLUTION INTRAVENOUS at 21:16

## 2019-03-03 RX ADMIN — METOPROLOL TARTRATE SCH MG: 5 INJECTION, SOLUTION INTRAVENOUS at 07:59

## 2019-03-03 RX ADMIN — ENOXAPARIN SODIUM SCH MG: 30 INJECTION SUBCUTANEOUS at 17:31

## 2019-03-03 RX ADMIN — DEXMEDETOMIDINE HYDROCHLORIDE SCH MLS/HR: 100 INJECTION, SOLUTION, CONCENTRATE INTRAVENOUS at 02:56

## 2019-03-03 RX ADMIN — DEXMEDETOMIDINE HYDROCHLORIDE SCH: 100 INJECTION, SOLUTION, CONCENTRATE INTRAVENOUS at 06:11

## 2019-03-03 RX ADMIN — METOPROLOL TARTRATE SCH MG: 5 INJECTION, SOLUTION INTRAVENOUS at 03:06

## 2019-03-03 RX ADMIN — DOXYCYCLINE SCH MLS/HR: 100 INJECTION, POWDER, LYOPHILIZED, FOR SOLUTION INTRAVENOUS at 06:11

## 2019-03-03 RX ADMIN — FUROSEMIDE SCH MG: 10 INJECTION, SOLUTION INTRAMUSCULAR; INTRAVENOUS at 21:16

## 2019-03-03 RX ADMIN — INSULIN LISPRO SCH UNITS: 100 INJECTION, SOLUTION INTRAVENOUS; SUBCUTANEOUS at 06:09

## 2019-03-03 RX ADMIN — INSULIN LISPRO SCH UNITS: 100 INJECTION, SOLUTION INTRAVENOUS; SUBCUTANEOUS at 17:31

## 2019-03-03 RX ADMIN — FUROSEMIDE SCH MG: 10 INJECTION, SOLUTION INTRAMUSCULAR; INTRAVENOUS at 12:43

## 2019-03-03 RX ADMIN — DEXMEDETOMIDINE HYDROCHLORIDE SCH MLS/HR: 100 INJECTION, SOLUTION, CONCENTRATE INTRAVENOUS at 23:32

## 2019-03-03 RX ADMIN — ACETAMINOPHEN PRN MG: 650 SUPPOSITORY RECTAL at 16:16

## 2019-03-03 RX ADMIN — CEFTRIAXONE SODIUM SCH MLS/HR: 1 INJECTION, POWDER, FOR SOLUTION INTRAVENOUS at 07:52

## 2019-03-03 RX ADMIN — DOXYCYCLINE SCH MLS/HR: 100 INJECTION, POWDER, LYOPHILIZED, FOR SOLUTION INTRAVENOUS at 17:31

## 2019-03-03 RX ADMIN — LEVOTHYROXINE SODIUM ANHYDROUS SCH MCG: 100 INJECTION, POWDER, LYOPHILIZED, FOR SOLUTION INTRAVENOUS at 06:09

## 2019-03-03 RX ADMIN — INSULIN LISPRO SCH UNITS: 100 INJECTION, SOLUTION INTRAVENOUS; SUBCUTANEOUS at 23:43

## 2019-03-03 NOTE — EEG
ELECTROENCEPHALOGRAPHY:

 

DATE OF STUDY:  03/03/19

 

LOCATION:  He is in the ICU bed 8.

 

REFERRING PHYSICIAN:  Dr. Vyas.

 

CLINICAL PROBLEM:  Unresponsiveness, possible encephalitis.

 

MEDICATIONS:  Include:

1.  Precedex.

2.  Lorazepam p.r.n.

3.  Insulin.

4.  Solu-Medrol.

5.  Synthroid.

6.  Lasix.

7.  Vibramycin.

8.  Lopressor.

9.  Lovenox.

 

REPORT:  The 16-channel EEG is remarkable for background rhythms of diffuse low 
to moderate voltage slow activity.  Rhythms are generally symmetrical.  The 
patient is clinically unresponsive and on Precedex.  There is fairly abundant, 
but very low-voltage beta rhythm seen diffusely.  There are no focal 
abnormalities.  The patient moves at times with movement artifact and 
occasionally moves one arm, but there are no clinical events described 
otherwise.  The patient is attempted to be aroused at the end of the recording 
and apparently does open his eyes briefly, but not visually engage the 
examiner.  He did not follow commands.  There is no evidence of sleep stages.  
There are no focal, lateralized or epileptiform abnormalities.

 

CLINICAL IMPRESSION:  Abnormal EEG due to diffuse slowing and disorganization 
of background rhythms consistent with diffuse cerebral dysfunction.  There are 
no focal or epileptiform features to this recording.

 

[id number]/[facility] #: [id number]

[facility]

## 2019-03-03 NOTE — PN
Subjective


Date of Service: 03/03/19


Interval History: 





Pt seen and examined.  Meds and labs reviewed.  Tachypnea per nursing staff





CC: N/A





ROS:  Could not be reliably obtained





PHYSICAL EXAM:


GEN APPEARANCE: Sedated, eyes open but not interactive not in acute distress, 

obese 


HEENT: NC/AT, PERRLA, moist oral mucosa, (-) throat erythema


NECK: Soft, supple, (-) cervical LAD, (-)JVD


HEART: S1S2 WNL, RRR, No MRG


CHEST: CTA, BL, GAE, No W/R/R, tachypnea


ABD: Soft, ND/NT, NABS 4x Q


EXT: No C/C/E


SKIN: Warm to touch


PSYCH: No active psychosis, hallucinations, depression, SI/HI





Objective


Active Medications: 








Acetaminophen (Tylenol Supp*)  650 mg CA Q6H PRN


   PRN Reason: Pain/Fever


   Last Admin: 03/03/19 16:16 Dose:  650 mg


Albuterol/Ipratropium (Duoneb (Albuterol 2.5 Mg/Ipratropium 0.5 Mg))  1 neb INH 

Q4H PRN


   PRN Reason: SOB/WHEEZING


Dextrose (D50w Syringe 50 Ml*)  12.5 gm IV PUSH .FOR FS < 60 - SS PRN


   PRN Reason: FS < 60


Enoxaparin Sodium (Lovenox(*))  30 mg SUBCUT Q24H Formerly [facility]


   Last Admin: 03/03/19 17:31 Dose:  30 mg


Fentanyl Citrate (Fentanyl*)  50 mcg IV SLOW PU Q4H PRN


   PRN Reason: Agitation


   Last Admin: 03/03/19 18:33 Dose:  50 mcg


Furosemide (Lasix Iv*)  40 mg IV BID ARIANA


   Last Admin: 03/03/19 12:43 Dose:  40 mg


Hydralazine HCl (Apresoline Iv*)  10 mg IV SLOW PU Q6H PRN


   PRN Reason: Systolic Bp Greater Than:190


   Last Admin: 03/03/19 16:16 Dose:  10 mg


Dexmedetomidine HCl 800 mcg/ (Sodium Chloride)  200 mls @ 50.57 mls/hr IVPB Q4H 

[facility]; Protocol


   Last Admin: 03/03/19 07:16 Dose:  42.2 mls/hr


Doxycycline Hyclate 100 mg/ (Sodium Chloride)  250 mls @ 250 mls/hr IVPB Q12H 

Former[facility]


   Last Admin: 03/03/19 17:31 Dose:  250 mls/hr


Methylprednisolone Sodium Succinate 1,000 mg/ Sodium Chloride  250 mls @ 250 mls

/hr IVPB Q24H Former[facility]


   Last Admin: 03/03/19 13:22 Dose:  250 mls/hr


Ceftriaxone Sodium 2 gm/ (Sodium Chloride)  50 mls @ 200 mls/hr IVPB Q24H [facility]


Insulin Glargine (Lantus(*))  18 units SUBCUT Q24H [facility]


Insulin Human Lispro (Humalog*)  0 units SUBCUT Q6HR Former[facility]; Protocol


   Last Admin: 03/03/19 17:31 Dose:  3 units


Levothyroxine Sodium (Synthroid Inj*)  37.5 mcg IV 0600 Former[facility]


   Last Admin: 03/03/19 06:09 Dose:  37.5 mcg


Lorazepam (Ativan Inj*)  2 mg IV PUSH Q4H PRN


   PRN Reason: ANXIETY


   Last Admin: 03/02/19 16:36 Dose:  2 mg


Metoprolol Tartrate (Lopressor Iv*)  5 mg IV Q6H [facility]


   Last Admin: 03/03/19 15:18 Dose:  5 mg








 Vital Signs - 8 hr











  03/03/19 03/03/19 03/03/19





  11:30 12:00 12:30


 


Temperature 100.2 F 100.2 F 100.2 F


 


Pulse Rate 71 72 70


 


Respiratory 20 19 20





Rate   


 


Blood Pressure 127/82 123/75 150/95





(mmHg)   


 


O2 Sat by Pulse 98 96 98





Oximetry   














  03/03/19 03/03/19 03/03/19





  13:00 13:30 14:00


 


Temperature 100.2 F 100.2 F 100.4 F


 


Pulse Rate 78 72 77


 


Respiratory 21 20 21





Rate   


 


Blood Pressure 146/94 153/100 





(mmHg)   


 


O2 Sat by Pulse 96 100 100





Oximetry   














  03/03/19 03/03/19 03/03/19





  14:09 14:30 14:33


 


Temperature  100.8 F 100.8 F


 


Pulse Rate 105 79 82


 


Respiratory 22 14 28





Rate   


 


Blood Pressure  169/100 152/99





(mmHg)   


 


O2 Sat by Pulse 98 99 99





Oximetry   














  03/03/19 03/03/19 03/03/19





  15:00 15:01 15:26


 


Temperature 100.9 F 100.9 F 


 


Pulse Rate 82 88 


 


Respiratory 20  20





Rate   


 


Blood Pressure 153/93  





(mmHg)   


 


O2 Sat by Pulse 98 100 





Oximetry   














  03/03/19 03/03/19 03/03/19





  15:30 16:00 16:01


 


Temperature 101.1 F 101.3 F 101.3 F


 


Pulse Rate 77 92 92


 


Respiratory   





Rate   


 


Blood Pressure 172/103 169/115 





(mmHg)   


 


O2 Sat by Pulse 99 99 98





Oximetry   














  03/03/19 03/03/19 03/03/19





  16:05 16:30 17:00


 


Temperature 101.3 F 100.8 F 101.3 F


 


Pulse Rate 85 108 105


 


Respiratory 16 17 12





Rate   


 


Blood Pressure 182/106 178/107 175/105





(mmHg)   


 


O2 Sat by Pulse 99 98 97





Oximetry   














  03/03/19 03/03/19 03/03/19





  17:01 17:30 17:44


 


Temperature 101.3 F 101.3 F 


 


Pulse Rate 105 87 


 


Respiratory 16 17 17





Rate   


 


Blood Pressure  151/90 





(mmHg)   


 


O2 Sat by Pulse 98 99 





Oximetry   














  03/03/19 03/03/19 03/03/19





  18:00 18:01 18:33


 


Temperature 101.3 F 101.3 F 


 


Pulse Rate 86 86 


 


Respiratory 14 17 20





Rate   


 


Blood Pressure   





(mmHg)   


 


O2 Sat by Pulse 99 99 





Oximetry   











Oxygen Devices in Use Now: OxyMask


Result Diagrams: 


 03/03/19 05:08





 03/03/19 13:19


Microbiology and Other Data: 


 Microbiology











 02/26/19 17:40 CSF Gram Stain (Tube 3) - Final





 Cerebral Spinal Fluid 


 


 02/26/19 19:50 Nasal Screen MRSA (PCR) - Final





 Nasal    Mrsa Not Detected














Assess/Plan/Problems-Billing


Mr Nelson is a 74 yo M who has a h/o DM, HTN, morbid obesity and CAD who 

presented to the ER after he was noted to fall a couple times but was also 

aphasic and subsequently found to be weak on the left. 





- Patient Problems


(1) Pulmonary edema


Current Visit: Yes   Status: Acute   Code(s): J81.1 - CHRONIC PULMONARY EDEMA   

SNOMED Code(s): 57051854


   Comment: 


-Likely iatrogenic


-No hx of CHF but hx of CAD


-Will trend troponin


-Likely diastolic CHF given preserved EF on most recent echo


-Placed pt on Lasix IV BID and D/Cd IVF   





(2) Altered mental status


Current Visit: Yes   Status: Acute   Code(s): R41.82 - ALTERED MENTAL STATUS, 

UNSPECIFIED   SNOMED Code(s): 137815651


   Comment: 


-Appreciate Dr. Weinstein re-eval; Acyclovir was D/Cd yesterday and will 

obtain varicella zoster add on to previous CSF 


-D/W Dr. Murray


-Doing well w/ Precedex gtt in addition w/50 ug q4H PRN IV fentanyl


-Likely due to viral meningoencephalitis vs. vasculitic process per Dr. Vyas 

and was placed on Solumedrol---will defer


-ID Consult pending although previously discussed w/Dr. Steinberg; will touch 

base tomorrow      





(3) Aphasia


Current Visit: Yes   Status: Acute   Code(s): R47.01 - APHASIA   SNOMED Code(s)

: 22961186


   Comment: Will monitor mental status/aphasia, wean precedex some to see if pt 

is improved at all.     





(4) ARF (acute renal failure)


Current Visit: Yes   Status: Acute   Comment: 


-Likely due to ATN given FENa= 4.74% and FE-Urea =51.86%


-Slightly improved


-Continue watchful waiting   





(5) HTN (hypertension)


Current Visit: Yes   Status: Acute   Code(s): I10 - ESSENTIAL (PRIMARY) 

HYPERTENSION   SNOMED Code(s): 72451325


   Comment: BP improved on precedex drip and scheduled IV metoprolol. Continue 

to monitor BP.   





(6) Type II diabetes mellitus


Current Visit: Yes   Status: Acute   Comment: 


-Increased Lantus as ordered


-Continue to monitor   





(7) CAD (coronary artery disease)


Current Visit: Yes   Status: Acute   Code(s): I25.10 - ATHSCL HEART DISEASE OF 

NATIVE CORONARY ARTERY W/O ANG PCTRS   SNOMED Code(s): 37423251


   Comment: No signs of ACS. Continue metoprolol. If pt remains unable to eat/

drink will need to reconsider placing NG tube (was tried earlier in the 

admission but was unable to be placed).   





(8) DVT prophylaxis


Current Visit: Yes   Status: Acute   Code(s): FWN2524 -    SNOMED Code(s): 

044219036


   Comment: lovenox   


Status and Disposition: 


-As above


-Awaiting Dr. Velarde input in AM

## 2019-03-03 NOTE — CONS
NEUROLOGY CONSULT FOLLOWUP:

 

DATE OF CONSULT:  03/03/19

 

HOSPITALIST:  Dr. Ratliff.

 

LOCATION:  He is inpatient, ICU bed 8.

 

CHIEF COMPLAINT:  Stupor, encephalopathy.

 

INTERVAL HISTORY:  Since yesterday, Lanre has been about the same in speaking 
with his nurse, Merle and Dr. Ratliff.  He gets agitated when he is off 
Precedex and has a blank stare.  He moves all limbs equally.  He has required 
sedation, but he has not been intubated.

 

MEDICATIONS:  Medications are reviewed and he is on:

1.  Precedex infusion as needed.

2.  Doxycycline 100 IV q.12 hours.

3.  Lovenox 30 mg subcutaneous q.24 hours.

4.  Fentanyl 15 mcg IV q.4 hours p.r.n. agitation.

5.  Furosemide 40 mg IV b.i.d.

6.  Hydralazine 10 mg IV q.6 hours p.r.n. systolic blood pressure greater than 
190.

7.  Insulin sliding scale.

8.  Levothyroxine 37.5 mg IV daily.

9.  Lorazepam  2 mg IV q.4 hours p.r.n. anxiety.

10.  Metoprolol 5 mg IV q.6 hours.

11.  Ceftriaxone 1 g IV q.24 hours.

 

PHYSICAL EXAM:  On exam, he is morbidly obese.  He is on nasal trumpet and 
oxygen mask.  Most recent temperature is 100.4 by Dunlap probe, blood pressure 
160/80, heart rate in the 80s, respiratory rate about 20, and oxygen saturation 
is 96% on supplemental oxygen.  Neck is supple.  Skin is warm and moist.  I do 
not see any rashes about the head or neck or face.  Pupils react equally to 
light from 5 down to about 3 mm.  His corneal reflexes are weak, but present 
symmetrically.  Facial grimace is present and symmetric.  He has restless 
movement of his legs when stimulated.  He does not respond to voice.  Plantar 
responses are flexor bilaterally today.  There is no myoclonus noted.  I can 
see his right fundus and his disc looks normal.

 

IMPRESSION:  Impression is that of unimproved encephalopathy of unclear 
etiology. His spinal fluid profile, fever, and elevated white blood cell count 
when came in suggest an infectious process.  His PCR for herpes simplex virus 1 
and 2 were negative, which are quite reliable with few false negatives.  I 
stopped acyclovir because of his climbing creatinine and that is improved today 
down to 2.26.  His C- reactive protein was elevated yesterday at 41.7 and 
sedimentation rate was elevated at 35.  His white count is normal today.

 

I put blood work in yesterday for lyme disease and mycoplasma.  I spoke with 
Dr. Ratliff about the possibility of varicella meningoencephalitis.  That 
would be a treatable cause of encephalitis and the spinal fluid was not checked 
for that.  

Dr. Ratliff is going to call the lab to see if spinal fluid is available to do 
VZV IgG and IgM antibody testing.  We will do simultaneous testing on blood and 
do an index if it is positive in the spinal fluid.  If it does come back 
positive, we will have to restart acyclovir and have to keep a close eye on 
renal function.  There is also a possibility that he has a central nervous 
system vasculitis.  He does have an elevated sedimentation rate and CRP.  Anti-
neutrophil antibodies and other autoimmune antibodies are pending.  I have put 
in an order for paraneoplastic antibodies and blood today.  I will go ahead and 
start Solu-Medrol 1000 mg per day. Discussed with Dr. Myers, our intensivist, 
and he agrees that this is fairly low downside to steroids at this point.  
Specifically, there does not appear to be a bacterial infection as a cause of 
his problems and highly unlikely that he has a fungal infection.  I will 
continue to follow him along.  My understanding is that Infectious Disease 
consultation may be available tomorrow.

 

The description of the eloisa chong also leads me to repeat his EEG today.  His 
initially EEG just showed some generalized slowing from Dr. Casas's prior 
interpretation.

 

 [id number]/[facility] #: [id number]

[facility]

## 2019-03-04 LAB
ALBUMIN SERPL BCG-MCNC: 3.9 G/DL (ref 3.2–5.2)
ALBUMIN/GLOB SERPL: 1.1 {RATIO} (ref 1–3)
ALP SERPL-CCNC: 65 U/L (ref 34–104)
ALT SERPL W P-5'-P-CCNC: 17 U/L (ref 7–52)
ANION GAP SERPL CALC-SCNC: 15 MMOL/L (ref 2–11)
AST SERPL-CCNC: 17 U/L (ref 13–39)
BASOPHILS # BLD AUTO: 0 10^3/UL (ref 0–0.2)
BUN SERPL-MCNC: 44 MG/DL (ref 6–24)
BUN/CREAT SERPL: 19.3 (ref 8–20)
CALCIUM SERPL-MCNC: 9.5 MG/DL (ref 8.6–10.3)
CHLORIDE SERPL-SCNC: 113 MMOL/L (ref 101–111)
EOSINOPHIL # BLD AUTO: 0 10^3/UL (ref 0–0.6)
GLOBULIN SER CALC-MCNC: 3.6 G/DL (ref 2–4)
GLUCOSE SERPL-MCNC: 414 MG/DL (ref 70–100)
HCO3 SERPL-SCNC: 22 MMOL/L (ref 22–32)
HCT VFR BLD AUTO: 53 % (ref 42–52)
HGB BLD-MCNC: 17.4 G/DL (ref 14–18)
LYMPHOCYTES # BLD AUTO: 1 10^3/UL (ref 1–4.8)
MAGNESIUM SERPL-MCNC: 1.7 MG/DL (ref 1.9–2.7)
MCH RBC QN AUTO: 30 PG (ref 27–31)
MCHC RBC AUTO-ENTMCNC: 33 G/DL (ref 31–36)
MCV RBC AUTO: 90 FL (ref 80–94)
MONOCYTES # BLD AUTO: 0.1 10^3/UL (ref 0–0.8)
NEUTROPHILS # BLD AUTO: 6 10^3/UL (ref 1.5–7.7)
NRBC # BLD AUTO: 0 10^3/UL
NRBC BLD QL AUTO: 0.2
PLATELET # BLD AUTO: 159 10^3/UL (ref 150–450)
POTASSIUM SERPL-SCNC: 4 MMOL/L (ref 3.5–5)
PROT SERPL-MCNC: 7.5 G/DL (ref 6.4–8.9)
RBC # BLD AUTO: 5.91 10^6/UL (ref 4–5.4)
RBC UR QL AUTO: (no result)
SODIUM SERPL-SCNC: 150 MMOL/L (ref 135–145)
WBC # BLD AUTO: 7.2 10^3/UL (ref 3.5–10.8)
WBC UR QL AUTO: (no result)

## 2019-03-04 RX ADMIN — ACETAMINOPHEN PRN MG: 650 SUPPOSITORY RECTAL at 01:52

## 2019-03-04 RX ADMIN — METOPROLOL TARTRATE SCH MG: 5 INJECTION, SOLUTION INTRAVENOUS at 03:04

## 2019-03-04 RX ADMIN — METHYLPREDNISOLONE SODIUM SUCCINATE SCH MLS/HR: 1 INJECTION, POWDER, FOR SOLUTION INTRAMUSCULAR; INTRAVENOUS at 13:20

## 2019-03-04 RX ADMIN — DEXMEDETOMIDINE HYDROCHLORIDE SCH MLS/HR: 100 INJECTION, SOLUTION, CONCENTRATE INTRAVENOUS at 14:54

## 2019-03-04 RX ADMIN — INSULIN LISPRO SCH UNITS: 100 INJECTION, SOLUTION INTRAVENOUS; SUBCUTANEOUS at 07:06

## 2019-03-04 RX ADMIN — DEXMEDETOMIDINE HYDROCHLORIDE SCH: 100 INJECTION, SOLUTION, CONCENTRATE INTRAVENOUS at 16:39

## 2019-03-04 RX ADMIN — LEVOTHYROXINE SODIUM ANHYDROUS SCH MCG: 100 INJECTION, POWDER, LYOPHILIZED, FOR SOLUTION INTRAVENOUS at 05:58

## 2019-03-04 RX ADMIN — INSULIN LISPRO SCH UNITS: 100 INJECTION, SOLUTION INTRAVENOUS; SUBCUTANEOUS at 18:32

## 2019-03-04 RX ADMIN — METOPROLOL TARTRATE SCH MG: 5 INJECTION, SOLUTION INTRAVENOUS at 09:09

## 2019-03-04 RX ADMIN — ENOXAPARIN SODIUM SCH MG: 30 INJECTION SUBCUTANEOUS at 18:31

## 2019-03-04 RX ADMIN — FUROSEMIDE SCH MG: 10 INJECTION, SOLUTION INTRAMUSCULAR; INTRAVENOUS at 09:09

## 2019-03-04 RX ADMIN — DEXMEDETOMIDINE HYDROCHLORIDE SCH MLS/HR: 100 INJECTION, SOLUTION, CONCENTRATE INTRAVENOUS at 03:22

## 2019-03-04 RX ADMIN — METOPROLOL TARTRATE SCH MG: 5 INJECTION, SOLUTION INTRAVENOUS at 19:58

## 2019-03-04 RX ADMIN — METOPROLOL TARTRATE SCH MG: 5 INJECTION, SOLUTION INTRAVENOUS at 14:54

## 2019-03-04 RX ADMIN — INSULIN LISPRO SCH UNITS: 100 INJECTION, SOLUTION INTRAVENOUS; SUBCUTANEOUS at 12:55

## 2019-03-04 RX ADMIN — DEXMEDETOMIDINE HYDROCHLORIDE SCH MLS/HR: 100 INJECTION, SOLUTION, CONCENTRATE INTRAVENOUS at 08:02

## 2019-03-04 RX ADMIN — DOXYCYCLINE SCH MLS/HR: 100 INJECTION, POWDER, LYOPHILIZED, FOR SOLUTION INTRAVENOUS at 05:58

## 2019-03-04 NOTE — CONS
CONSULTATION REPORT:

 

DATE OF CONSULT:  03/04/19

 

REQUESTING PHYSICIAN:  Dr. Ratliff.

 

CONSULTING SERVICE:  Infectious Disease.

 

REASON FOR CONSULT:  Encephalitis.

 

IMPRESSION:

1.  Decrease in mental status with initial left-sided weakness.  Negative brain 
imaging. Lumbar puncture showed 15 white cells, lymphocytic predominance, the 
glucose 150, the protein 53.  Differential does include an infectious 
encephalitis, typically viral.  He has had HSV-1 and 2 ruled out by PCR.  
Varicella is a consideration, though usually self-limited.  He had a negative 
CSF VDRL as far as bacterial causes, however, that is not a completely 
sensitive test for neurosyphilis.  He had a negative HIV test.  Other bacterial 
considerations, I think Lyme less likely, nothing to suggest a primary 
bacterial meningoencephalitis.  With a lymphocytic predominance, an atypical 
manifestation of enterovirus is a consideration, less likely tuberculosis.

2.  Left arm mass.  Could be underlying process be paraneoplastic instead of 
infectious.

3.  Coronary artery disease.

4.  Type 2 diabetes.

 

RECOMMENDATIONS:  Agree with discontinuing acyclovir.  He had a varicella PCR 
pending.  I will add a syphilis IgG to the blood.  Stop the ceftriaxone and 
doxycycline.  He is on empiric corticosteroids for possibility of noninfectious 
inflammatory process and has had paraneoplastic and autoimmune testing sent.  
We will also add enterovirus PCR.

 

HISTORY OF PRESENT ILLNESS:  This is a 73-year-old man admitted with change in 
mental status.  He cannot provide any details as he is still not back to his 
baseline.  History was obtained instead from review of the medical record and 
discussion with Dr. Tenorio, Dr. Casas, and Dr. Ratliff.  He apparently lives 
with his grandson who had noticed him to be not acting himself, less interactive
, sleeping more over the 2 to 3 days before he came to the hospital.  He is 
unsure about any fevers, chills, or other systemic symptoms, but eventually he 
started to have left-sided weakness and significant decline in mental status, 
so was brought by ER to the hospital on 02/26/19.  Brain CT, MRI, head and neck 
MRA unrevealing for stroke or vascular causes.  No temporal lobe involvement.  
He has been febrile off and on since he has been here.  He was on acyclovir 
until the HSV testing came back negative based on the initial CSF pleocytosis.

 

He has had increase in troponin with a slight initial leukocytosis which has 
since resolved, mild increase in sedimentation rate of 35 and a CRP of 41.  
Left arm mass imaging showed solid mass corresponding to the abnormality.  It 
is 11 x 6 cm.

 

PAST MEDICAL HISTORY:

1.  Hypertension.

2.  Coronary artery disease.

3.  Type 2 diabetes.

4.  Hyperlipidemia.

5.  Status post coronary artery bypass, 2003.

6.  PCI, 2009.

 

ALLERGIES:  No known drug allergies.

 

MEDICATIONS:

1.  Tylenol.

2.  Albuterol inhaler.

3.  Dexmedetomidine infusion.

4.  Doxycycline 100 mg IV every 12 hours.

5.  Enoxaparin.

6.  Insulin glargine.

7.  Methylprednisolone 1 g a day.

8.  Ceftriaxone 2 g a day.

 

SOCIAL HISTORY:  Lives with his grandson.  He is a past smoker.

 

FAMILY HISTORY:  Unknown.

 

REVIEW OF SYSTEMS:  Unobtainable given his mental status.

 

PHYSICAL EXAM:  Vital Signs:  Temperature 37, heart rate 90, respiratory rate 20
, blood pressure 125/90, oxygen saturation 97% on 5 L by nasal cannula.  In 
general, he is asleep, but awakens his eyes to voice, does not regard, track or 
follow commands.  HEENT:  There is no conjunctival hemorrhage.  Oropharynx 
without lesions.  Neck is supple without mass.  Heart is regular and 
tachycardic without murmurs.  Lungs are clear to auscultation bilaterally.  
Abdomen:  Soft, nontender, nondistended.  There are bowel sounds present.  Skin
:  There is no rash or splinter hemorrhage.  Musculoskeletal:  There is no 
joint synovitis.  There is a large left posterior upper arm mass, which is 
noninflamed, nontender, slightly mobile, well demarcated.

 

LABORATORY DATA:  Sodium 150, creatinine 2.2.  Troponin 0.2. White blood cell 
count 7, hemoglobin 17, platelets 159.

 

Please see impressions and recommendations outlined above.

 

Thanks for asking me to see Mr. Nelson in consultation.

 

 [id number]/[facility] #: [id number]

ANALIA

## 2019-03-04 NOTE — PN
Date of Service: 19


Vital Signs: 











Temp Pulse Resp BP SpO2 FiO2


 


99.6 F 97 20 124/90 97 


 


19 16:11 19 11:01 19 11:01 19 11:01 19 11:01 











Physical Exam: 


Gen: Obtunded, NAD





HEENT: Atraumatic, normocephalic, nasal trumpet to right nares





Lungs: Clear bilaterally at apices, no wheezing, bases diminished





Cardiac: RSR on telemetry, rate and rhythm regular, S1S2 present





Abdomen: Obese, non-tender-non-distended, +BSx4 q





Extremities: trace bilateral LE edema, +2 pulses, no clubbing, no cyanosis





Neuro: Obtunded, does not track, on Precedex





Fluid Balance (Past 24 Hours): 


I=     O=     Net 


 Intake & Output











 19





 06:59 06:59 06:59 06:59


 


Intake Total 2780 3348 1830 


 


Output Total 4055 2980 5565 675


 


Balance -1275 368 -3735 -675


 


Weight 337 lb 4.916 oz 326 lb 11.601 oz 317 lb 3.923 oz 


 


Intake:    


 


  IV Fluids 2008 714 


 


    D5W  1775 150 


 


    D5W 1/2 NS 1034 208  


 


    NS (0.9%) 974  91 


 


    ivm   473 


 


  IVPB 250 5 309 


 


    ABX - DOXYCYCLINE  5 309 


 


    ANTIVIRAL - ACYCLOVIR 250   


 


  Medicated  1360 807 


 


    CC - Dexmedetomidine/ 522 1360 807 





    Precedex    


 


  Oral 0 0 0 


 


Output:    


 


  Urine 375  240 


 


  [place] 3680 2980 5325 675


 


Other:    


 


  Estimated Stool Amount Small Small Small 





 











Labs: 


 Laboratory Results - last 24 hr











  19





  17:26 21:10 23:39


 


WBC   


 


RBC   


 


Hgb   


 


Hct   


 


MCV   


 


MCH   


 


MCHC   


 


RDW   


 


Plt Count   


 


MPV   


 


Neut % (Auto)   


 


Lymph % (Auto)   


 


Mono % (Auto)   


 


Eos % (Auto)   


 


Baso % (Auto)   


 


Absolute Neuts (auto)   


 


Absolute Lymphs (auto)   


 


Absolute Monos (auto)   


 


Absolute Eos (auto)   


 


Absolute Basos (auto)   


 


Absolute Nucleated RBC   


 


Nucleated RBC %   


 


Sodium   


 


Potassium   


 


Chloride   


 


Carbon Dioxide   


 


Anion Gap   


 


BUN   


 


Creatinine   


 


Est GFR ( Amer)   


 


Est GFR (Non-Af Amer)   


 


BUN/Creatinine Ratio   


 


Glucose   


 


POC Glucose (mg/dL)  287 H   347 H


 


Calcium   


 


Phosphorus   


 


Magnesium   


 


Total Bilirubin   


 


AST   


 


ALT   


 


Alkaline Phosphatase   


 


Troponin I   0.33 H* 


 


Total Protein   


 


Albumin   


 


Globulin   


 


Albumin/Globulin Ratio   


 


Urine Color   


 


Urine Appearance   


 


Urine pH   


 


Ur Specific Gravity   


 


Urine Protein   


 


Urine Ketones   


 


Urine Blood   


 


Urine Nitrate   


 


Urine Bilirubin   


 


Urine Urobilinogen   


 


Ur Leukocyte Esterase   


 


Urine WBC (Auto)   


 


Urine RBC (Auto)   


 


Ur Squamous Epith Cells   


 


Urine Bacteria   


 


Urine Glucose   














  19





  01:50 05:00 05:00


 


WBC   7.2 


 


RBC   5.91 H 


 


Hgb   17.4 


 


Hct   53 H 


 


MCV   90 


 


MCH   30 


 


MCHC   33 


 


RDW   15 


 


Plt Count   159 


 


MPV   9.0 


 


Neut % (Auto)   84.2 


 


Lymph % (Auto)   13.6 


 


Mono % (Auto)   1.9 


 


Eos % (Auto)   0.1 


 


Baso % (Auto)   0.2 


 


Absolute Neuts (auto)   6.0 


 


Absolute Lymphs (auto)   1.0 


 


Absolute Monos (auto)   0.1 


 


Absolute Eos (auto)   0 


 


Absolute Basos (auto)   0 


 


Absolute Nucleated RBC   0 


 


Nucleated RBC %   0.2 


 


Sodium    150 H


 


Potassium    4.0


 


Chloride    113 H


 


Carbon Dioxide    22


 


Anion Gap    15 H


 


BUN    44 H


 


Creatinine    2.28 H


 


Est GFR ( Amer)    34.2


 


Est GFR (Non-Af Amer)    28.3


 


BUN/Creatinine Ratio    19.3


 


Glucose    414 H


 


POC Glucose (mg/dL)   


 


Calcium    9.5


 


Phosphorus    4.9


 


Magnesium    1.7 L


 


Total Bilirubin    0.70


 


AST    17


 


ALT    17


 


Alkaline Phosphatase    65


 


Troponin I  0.32 H*  


 


Total Protein    7.5


 


Albumin    3.9


 


Globulin    3.6


 


Albumin/Globulin Ratio    1.1


 


Urine Color   


 


Urine Appearance   


 


Urine pH   


 


Ur Specific Gravity   


 


Urine Protein   


 


Urine Ketones   


 


Urine Blood   


 


Urine Nitrate   


 


Urine Bilirubin   


 


Urine Urobilinogen   


 


Ur Leukocyte Esterase   


 


Urine WBC (Auto)   


 


Urine RBC (Auto)   


 


Ur Squamous Epith Cells   


 


Urine Bacteria   


 


Urine Glucose   














  19





  07:53 11:10 12:51


 


WBC   


 


RBC   


 


Hgb   


 


Hct   


 


MCV   


 


MCH   


 


MCHC   


 


RDW   


 


Plt Count   


 


MPV   


 


Neut % (Auto)   


 


Lymph % (Auto)   


 


Mono % (Auto)   


 


Eos % (Auto)   


 


Baso % (Auto)   


 


Absolute Neuts (auto)   


 


Absolute Lymphs (auto)   


 


Absolute Monos (auto)   


 


Absolute Eos (auto)   


 


Absolute Basos (auto)   


 


Absolute Nucleated RBC   


 


Nucleated RBC %   


 


Sodium   


 


Potassium   


 


Chloride   


 


Carbon Dioxide   


 


Anion Gap   


 


BUN   


 


Creatinine   


 


Est GFR ( Amer)   


 


Est GFR (Non-Af Amer)   


 


BUN/Creatinine Ratio   


 


Glucose   


 


POC Glucose (mg/dL)    334 H


 


Calcium   


 


Phosphorus   


 


Magnesium   


 


Total Bilirubin   


 


AST   


 


ALT   


 


Alkaline Phosphatase   


 


Troponin I  0.22 H*  


 


Total Protein   


 


Albumin   


 


Globulin   


 


Albumin/Globulin Ratio   


 


Urine Color   Yellow 


 


Urine Appearance   Cloudy 


 


Urine pH   5.0 


 


Ur Specific [place]   1.011 


 


Urine Protein   Negative 


 


Urine Ketones   Trace A 


 


Urine Blood   1+ A 


 


Urine Nitrate   Negative 


 


Urine Bilirubin   Negative 


 


Urine Urobilinogen   Negative 


 


Ur Leukocyte Esterase   Negative 


 


Urine WBC (Auto)   1+(6-10/hpf) A 


 


Urine RBC (Auto)   1+(3-5/hpf) A 


 


Ur Squamous Epith Cells   Present A 


 


Urine Bacteria   Absent 


 


Urine Glucose   3+(>=500 mg/dl) A 











Studies: 





Patient Name:                    LEANDRO ABARCA [facility]                             

                                                                            

Medical Record#: [id number]


Ordering Physician: Isidoro Ratliff MD                                    

                                                                            

Acct.#: [id number]


:         1945                    Age: 73   Sex: M                    

                                                                            

Location: [facility]ARE UN[facility]


Exam Date: 19 0948                                                       

                                                                            ADM 

Status: ADM IN


Order Information:                                               CHEST AP OR 

PORT


Accession Number:                                                [id number]


CPT:                                                             [id number]


INDICATION: Hypoxia and increased work of breathing





COMPARISON: Most recent comparison chest x-ray is dated 2019


 


TECHNIQUE: Single AP portable view of the chest was obtained.





FINDINGS: 





Image quality is compromised due to the relative inferiority of a portable 

chest x-ray.





Stable postsurgical changes include sternotomy wires and surgical clips 

overlying the


mediastinum.





There is a moderate degree of cardiomegaly similar in appearance to the 

previous chest


x-ray.





The vasculature appears mildly engorged and indistinct. The diaphragm and 

costophrenic


angles are adequately defined.





Visualized bones are normal for the patient's age.





IMPRESSION:  Chest x-ray findings are consistent with cardiogenic pulmonary 

edema similar


in appearance to the most recent chest x-ray dated 2019. 





Patient Name:         LEANDRO ABARCA III                                        

                        Medical Record#: [id number]


Ordering Physician: Aracely Tenorio DO                                        

                        Acct.#: [id number]


:     1945         Age: 73   Sex: M                                   

                        Location: [facility]ARE UNIT


Exam Date: 19 0722                                                       

                        ADM Status: ADM IN


Order Information:                         US RENAL COMPLETE


Accession Number:                          [id number]


CPT:                                       [id number]


HISTORY: renal failure





COMPARISONS: None





TECHNIQUE: Multiple transverse and longitudinal ultrasound images were obtained 

of the


kidneys using grayscale and color Doppler imaging.





FINDINGS:








RIGHT KIDNEY: The right kidney is normal in shape, size, contour, and 

echogenicity.  There


is no hydronephrosis or nephrolithiasis. 


The right kidney measures 14.9 x 7.3 x 6.4 cm. 





LEFT KIDNEY: The left kidney is normal in shape, size, contour, and 

echogenicity.  There


is no hydronephrosis or nephrolithiasis. 


The left kidney measures 14.4 x 6.8 x 6.6 cm.





BLADDER: No images are submitted of the bladder.





AORTA AND IVC: No images are submitted of the vasculature.


RETROPERITONEUM: Unremarkable.





OTHER: The liver is echogenic.





IMPRESSION: 


NO HYDRONEPHROSIS OR NEPHROLITHIASIS.


FATTY LIVER











Nutrition: 





NPO


Impression: 





This is a 73 year old male that presented with AMS of unclear etiology, being 

ruled out for viral vs autoimmune vs paraneoplastic syndrome.


Plan: 





Plan:





1. Altered Mental Status


 - Etiology viral encephalitis vs paraneoplastic, vs autoimmune; less likely 

bacterial given negative workup thus far


 - Continue precedex for comfort and fentanyl


 - ID and neuro following, appreciate recommendations from all disciplines


 - Atbx discontinued, continue steroids 1 gram daily per neuro, follow lab 

studies per ID and neuro recs


 - Supportive care, pulmonary toilet


 - Will need to consider nutrition if mentation does not improve, per record 

unable to pass NG tube





2. [place]


 - 2/2 ATN


 - Renal US as above, no hydro or obstruction


 - Maldonado draining


 - Should consult nephrology given rising sodium (150 today) and rising 

creatinine





3. Acute Pulmonary edema


 - 2/2 diastolic HF


 - Initiate lasix IV BID and continue to monitor renal function while diuresing


 - I&Os, daily weights


 - Continue maldonado and monitor output


 - IVF dc'd





4. HTN


 - BP stable, continue hydralazine PRN and metoprolol Q6h PRN





5. DM


 - With hyperglycemia likely 2/2 steroids


 - Increase long acting to 30 daily and continue lispro SS





6. Hx of CAD


 - Elevated troponins, but no EKG changes, ECHO with EF of 50-55% and no 

valvular abnormalities and no wall motion abnormalities


 - Likely demand in the setting of acute illness


 - Trops trending down





7. Hypothyroidism


 - Continue synthroid daily











Critical Care Time: 70 minutes.

## 2019-03-04 NOTE — PN
Subjective


Date of Service: 03/04/19


Length of Stay: 6 Days





Neurology is following Mr. Nelson for the evaluation and management of 

encephalitis. 





Interval History: 


Mr. Nelson is a 73-year-old man who has history of hypertension, dyslipidemia, 

obesity, DMII, and CARD s/p CABG.  He presented with aphasia on 2/26/2019.  He 

was evaluated by Dr. Casas.  The symptoms began acutely over the past 2-3 

days.  He had a gradual onset of word finding difficulty. He was admitted for 

evaluation of stroke.  But other differential diagnosis such as herpes 

encephalitis was also considered.  He was admitted for further evaluation. An 

EEG that's done on 2/27 showed moderate encephalopathy but an EEG completed on 

March 3rd, showed severe encephalopathy. He had an MRI of the brain that showed 

moderate sinus mucosal inflammatory disease with an air-fluid level in the 

right maxillary sinus. 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 

paraneoplastic autoimmune disease.  He was evaluated by Dr. Steinberg. 





S: The patient is resting comfortable in no distress.  He is snoring loudly.  

He does not respond to the examiner.  





Reviewed the patient's MRI brain and EEG studies.   


Review of Systems: 


Patient does not respond to command. 





Objective


Active Medications: 








Acetaminophen (Tylenol Supp*)  650 mg MD Q6H PRN


   PRN Reason: Pain/Fever


   Last Admin: 03/04/19 01:52 Dose:  650 mg


Albuterol/Ipratropium (Duoneb (Albuterol 2.5 Mg/Ipratropium 0.5 Mg))  1 neb INH 

Q4H PRN


   PRN Reason: SOB/WHEEZING


Dextrose (D50w Syringe 50 Ml*)  12.5 gm IV PUSH .FOR FS < 60 - SS PRN


   PRN Reason: FS < 60


Enoxaparin Sodium (Lovenox(*))  30 mg SUBCUT Q24H [facility]


   Last Admin: 03/04/19 18:31 Dose:  30 mg


Fentanyl Citrate (Fentanyl*)  50 mcg IV SLOW PU Q4H PRN


   PRN Reason: Agitation


   Last Admin: 03/03/19 18:33 Dose:  50 mcg


Furosemide (Lasix Iv*)  40 mg IV DAILY ARIANA


Hydralazine HCl (Apresoline Iv*)  10 mg IV SLOW PU Q6H PRN


   PRN Reason: Systolic Bp Greater Than:190


   Last Admin: 03/03/19 16:16 Dose:  10 mg


Methylprednisolone Sodium Succinate 1,000 mg/ Sodium Chloride  250 mls @ 250 mls

/hr IVPB Q24H [facility]


   Last Admin: 03/04/19 13:20 Dose:  250 mls/hr


Dexmedetomidine HCl 800 mcg/ (Sodium Chloride)  200 mls @ 28.9 mls/hr IVPB Q7H 

[facility]; Protocol


   Last Admin: 03/04/19 14:54 Dose:  28.9 mls/hr


Dextrose (D5w 1000 Ml Bag*)  1,000 mls @ 50 mls/hr IV PER RATE [facility]


   Stop: 03/05/19 14:59


Insulin Glargine (Lantus(*))  30 units SUBCUT Q24H [facility]


   Last Admin: 03/04/19 18:31 Dose:  30 units


Insulin Human Lispro (Humalog*)  0 units SUBCUT Q6H [facility]; Protocol


   Last Admin: 03/04/19 18:32 Dose:  12 units


Levothyroxine Sodium (Synthroid Inj*)  37.5 mcg IV 0600 [facility]


   Last Admin: 03/04/19 05:58 Dose:  37.5 mcg


Lorazepam (Ativan Inj*)  2 mg IV PUSH Q4H PRN


   PRN Reason: ANXIETY


   Last Admin: 03/02/19 16:36 Dose:  2 mg


Metoprolol Tartrate (Lopressor Iv*)  5 mg IV Q6H [facility]


   Last Admin: 03/04/19 14:54 Dose:  5 mg








 Vital Signs











  03/04/19 03/04/19 03/04/19





  16:30 17:00 17:30


 


Temperature   


 


Pulse Rate 99 101 103


 


Respiratory 18 19 18





Rate   


 


Blood Pressure 122/78 116/70 109/74





(mmHg)   


 


O2 Sat by Pulse 97 97 96





Oximetry   














  03/04/19





  18:00


 


Temperature 


 


Pulse Rate 102


 


Respiratory 19





Rate 


 


Blood Pressure 108/70





(mmHg) 


 


O2 Sat by Pulse 96





Oximetry 











Intake and Output Last 24 Hours











 03/02/19 03/03/19 03/04/19 03/05/19





 06:59 06:59 06:59 06:59


 


Intake Total 2780 3348 1830 1118


 


Output Total 4055 2980 5565 1070


 


Balance -9267 233 -9870 48


 


Weight 337 lb 4.916 oz 326 lb 11.601 oz 317 lb 3.923 oz 


 


Intake:    


 


  IV Fluids 2008 1983 714 36


 


    D5W  1775 150 


 


    D5W 1/2 NS 1034 208  


 


    NS (0.9%) 974  91 36


 


    ivm   473 


 


  IVPB 250 5 309 687


 


    ABX - CEFTRIAXONE    65


 


    ABX - DOXYCYCLINE  5 309 250


 


    ANTIVIRAL - ACYCLOVIR 250   


 


    Magnesium    112


 


    PB - METHYLPREDNISOLONE    260


 


  Medicated  1360 807 395


 


    CC - Dexmedetomidine/ 522 1360 807 395





    Precedex    


 


  Oral 0 0 0 


 


Output:    


 


  Urine 375  240 


 


  [place] 3680 2980 5325 1070


 


Other:    


 


  Estimated Stool Amount Small Small Small 











Oxygen Devices in Use Now: OxyMask


Neurology Exam: 





General: 





Ill appearing obese man who is unresponsive. Neck: supple with no evidence of 

rigidity.   He groans and moans but does not appropriately respond. There is a 

large left upper arm mass, noninflamed, mobile.








Neurological Findings: 





Awake but non-verbal.  He has a non-breather mask on. He becomes slightly 

drowsy after a few minutes of alertness.  He does not track examiner. 


PERRL, EOM-I, no facial asymmetry.  


He moves all extremities spontaneously but does not follow command. 


He withdrew to distal noxious stimuli. 








Result Diagrams: 


 03/04/19 05:00





 03/04/19 05:00


Microbiology and Other Data: 


  








Assessment/Plan





1. Acute encephalitis of presumably viral etiology- 


The opening pressure was not obtained, or at least, cannot be found in the 

medical chart.  If his mentation does not improve, I would like to proceed with 

a repeat LP (can be done at bedside) to check opening pressure, and if elevated

, reduce the pressure.  The second EEG definitely changed from the first study, 

suggesting either sedation, increase ICP pressure, or deeper encephalopathy 

from concurrent infection or metabolic disturbance.  I also recommend repeating 

an EEG tomorrow if he continues to be encephalopathic to increase the 

sensitivity for picking up any possible non-convulsive status epilepticus.  Of 

note, the patient does have evidence of acute sinus disease on MRI but without 

any evidence of CNS invasive disease- keeping in mind he can be 

immunosuppressed from diabetes.   I prefer to hold off on high dose Solu-medrol 

since the etiology of his encephalitis is unknown, and his glucose is 

significantly elevated now with therapy.  Continue close monitoring, supportive 

care, and neuro checks every one hour. Trial BiPAP at night given his snoring 

and body habitus.  





I will continue to follow.

## 2019-03-04 NOTE — PN
Subjective


Date of Service: 03/04/19


Interval History: 





Pt seen and examined.  Meds and labs reviewed.  





ROS:  Could not be reliably obtained





PHYSICAL EXAM:


GEN APPEARANCE: Sedated, eyes open but not interactive not in acute distress, 

obese 


HEENT: NC/AT, PERRLA, moist oral mucosa, (-) throat erythema


NECK: Soft, supple, (-) cervical LAD, (-)JVD


HEART: S1S2 WNL, RRR, No MRG


CHEST: CTA, BL, GAE, No W/R/R, tachypnea


ABD: Soft, ND/NT, NABS 4x Q


EXT: No C/C/E


SKIN: Warm to touch


PSYCH: No active psychosis, hallucinations, depression, SI/HI





Objective


Active Medications: 








Acetaminophen (Tylenol Supp*)  650 mg WA Q6H PRN


   PRN Reason: Pain/Fever


   Last Admin: 03/04/19 01:52 Dose:  650 mg


Albuterol/Ipratropium (Duoneb (Albuterol 2.5 Mg/Ipratropium 0.5 Mg))  1 neb INH 

Q4H PRN


   PRN Reason: SOB/WHEEZING


Dextrose (D50w Syringe 50 Ml*)  12.5 gm IV PUSH .FOR FS < 60 - SS PRN


   PRN Reason: FS < 60


Enoxaparin Sodium (Lovenox(*))  30 mg SUBCUT Q24H [facility]


   Last Admin: 03/03/19 17:31 Dose:  30 mg


Fentanyl Citrate (Fentanyl*)  50 mcg IV SLOW PU Q4H PRN


   PRN Reason: Agitation


   Last Admin: 03/03/19 18:33 Dose:  50 mcg


Furosemide (Lasix Iv*)  40 mg IV BID [facility]


Hydralazine HCl (Apresoline Iv*)  10 mg IV SLOW PU Q6H PRN


   PRN Reason: Systolic Bp Greater Than:190


   Last Admin: 03/03/19 16:16 Dose:  10 mg


Methylprednisolone Sodium Succinate 1,000 mg/ Sodium Chloride  250 mls @ 250 mls

/hr IVPB Q24H [facility]


   Last Admin: 03/04/19 13:20 Dose:  250 mls/hr


Dexmedetomidine HCl 800 mcg/ (Sodium Chloride)  200 mls @ 28.9 mls/hr IVPB Q7H 

[facility]; Protocol


   Last Admin: 03/04/19 14:54 Dose:  28.9 mls/hr


Insulin Glargine (Lantus(*))  30 units SUBCUT Q24H [facility]


Insulin Human Lispro (Humalog*)  0 units SUBCUT Q6H [facility]; Protocol


   Last Admin: 03/04/19 12:55 Dose:  12 units


Levothyroxine Sodium (Synthroid Inj*)  37.5 mcg IV 0600 [facility]


   Last Admin: 03/04/19 05:58 Dose:  37.5 mcg


Lorazepam (Ativan Inj*)  2 mg IV PUSH Q4H PRN


   PRN Reason: ANXIETY


   Last Admin: 03/02/19 16:36 Dose:  2 mg


Metoprolol Tartrate (Lopressor Iv*)  5 mg IV Q6H [facility]


   Last Admin: 03/04/19 14:54 Dose:  5 mg








 Vital Signs - 8 hr











  03/04/19 03/04/19 03/04/19





  10:30 11:00 11:01


 


Temperature   


 


Pulse Rate 90 92 97


 


Respiratory 20 20 20





Rate   


 


Blood Pressure 138/83  124/90





(mmHg)   


 


O2 Sat by Pulse 98 98 97





Oximetry   














  03/04/19 03/04/19 03/04/19





  11:30 12:00 12:14


 


Temperature   98.6 F


 


Pulse Rate 100 90 


 


Respiratory 20 18 





Rate   


 


Blood Pressure 126/80 114/73 





(mmHg)   


 


O2 Sat by Pulse 98 98 





Oximetry   














  03/04/19 03/04/19 03/04/19





  12:30 13:00 13:30


 


Temperature   


 


Pulse Rate 90 98 90


 


Respiratory 21 19 18





Rate   


 


Blood Pressure 119/74 122/81 110/70





(mmHg)   


 


O2 Sat by Pulse 97 97 98





Oximetry   














  03/04/19 03/04/19 03/04/19





  14:00 14:30 15:00


 


Temperature   


 


Pulse Rate 92 90 


 


Respiratory 15 19 17





Rate   


 


Blood Pressure 112/87 121/80 





(mmHg)   


 


O2 Sat by Pulse 98 98 





Oximetry   














  03/04/19 03/04/19 03/04/19





  15:01 15:30 16:00


 


Temperature   


 


Pulse Rate 81 92 87


 


Respiratory 17 18 18





Rate   


 


Blood Pressure 111/68 102/77 121/65





(mmHg)   


 


O2 Sat by Pulse 96 97 97





Oximetry   














  03/04/19 03/04/19 03/04/19





  16:11 16:30 17:00


 


Temperature 99.6 F  


 


Pulse Rate  99 101


 


Respiratory  18 19





Rate   


 


Blood Pressure  122/78 116/70





(mmHg)   


 


O2 Sat by Pulse  97 97





Oximetry   














  03/04/19 03/04/19





  17:30 18:00


 


Temperature  


 


Pulse Rate 103 


 


Respiratory 18 19





Rate  


 


Blood Pressure 109/74 





(mmHg)  


 


O2 Sat by Pulse 96 





Oximetry  











Oxygen Devices in Use Now: OxyMask


Result Diagrams: 


 03/04/19 05:00





 03/04/19 05:00


Microbiology and Other Data: 


 Microbiology











 02/26/19 17:40 CSF Gram Stain (Tube 3) - Final





 Cerebral Spinal Fluid 


 


 02/26/19 19:50 Nasal Screen MRSA (PCR) - Final





 Nasal    Mrsa Not Detected














Assess/Plan/Problems-Billing


Mr Nelson is a 74 yo M who has a h/o DM, HTN, morbid obesity and CAD who 

presented to the ER after he was noted to fall a couple times but was also 

aphasic and subsequently found to be weak on the left. 





- Patient Problems


(1) Fever


Current Visit: Yes   Status: Acute   Code(s): R50.9 - FEVER, UNSPECIFIED   

SNOMED Code(s): 990472145


   Comment: 


-Repeated Blood cultures and U/Aappears to have pyuria but not clean catch; 

Dr. Steinberg D/Cd Rocephin due to unlikely bacterial cause of MS change; left 

message and will defer with Dr. Stienberg 


-Awaiting CXR results


-Sent for Blood Cx and will defer w/hospitalist colleage to follow in AM   





(2) Elevated troponin


Current Visit: Yes   Status: Acute   Code(s): R74.8 - ABNORMAL LEVELS OF OTHER 

SERUM ENZYMES   SNOMED Code(s): 961913837


   Comment: 


#Elevated troponins:


-Likely due to demand in the setting of ARF likely due to ATN


-Improving


   





(3) Pulmonary edema


Current Visit: Yes   Status: Acute   Code(s): J81.1 - CHRONIC PULMONARY EDEMA   

SNOMED Code(s): 25982126


   Comment: 


-Likely iatrogenic


-No hx of CHF but hx of CAD


-Will trend troponin


-Likely diastolic CHF given preserved EF on most recent echo


-Placed pt on Lasix IV BID and D/Cd IVF   





(4) Altered mental status


Current Visit: Yes   Status: Acute   Code(s): R41.82 - ALTERED MENTAL STATUS, 

UNSPECIFIED   SNOMED Code(s): 155586734


   Comment: 


-Apprecieate Dr. Velarde input


-Doing well w/ Precedex gtt in addition w/50 ug q4H PRN IV fentanyl


-Likely due to viral meningoencephalitis vs. vasculitic process per Dr. Vyas 

and was placed on Solumedrol---will defer   





(5) Aphasia


Current Visit: Yes   Status: Acute   Code(s): R47.01 - APHASIA   SNOMED Code(s)

: 98347290


   Comment: Will monitor mental status/aphasia, wean precedex some to see if pt 

is improved at all.     





(6) ARF (acute renal failure)


Current Visit: Yes   Status: Acute   Comment: 


-Likely due to ATN given FENa= 4.74% and FE-Urea =51.86%


-Slightly improved


-Continue watchful waiting   





(7) HTN (hypertension)


Current Visit: Yes   Status: Acute   Code(s): I10 - ESSENTIAL (PRIMARY) 

HYPERTENSION   SNOMED Code(s): 23608538


   Comment: BP improved on precedex drip and scheduled IV metoprolol. Continue 

to monitor BP.   





(8) Type II diabetes mellitus


Current Visit: Yes   Status: Acute   Comment: 


-Appreciate intensivist adjustment and will await


-Continue to monitor      





(9) CAD (coronary artery disease)


Current Visit: Yes   Status: Acute   Code(s): I25.10 - ATHSCL HEART DISEASE OF 

NATIVE CORONARY ARTERY W/O ANG PCTRS   SNOMED Code(s): 06630549


   Comment: No signs of ACS. Continue metoprolol. If pt remains unable to eat/

drink will need to reconsider placing NG tube (was tried earlier in the 

admission but was unable to be placed).   





(10) DVT prophylaxis


Current Visit: Yes   Status: Acute   Code(s): RZW7561 -    SNOMED Code(s): 

614836820


   Comment: lovenox   


Status and Disposition: 


-As above


-Awaiting Dr. Velarde input regarding fever given U/A shows pyuria but 

admittedly not clean catch; also awaiting on repeat CXR

## 2019-03-05 VITALS — DIASTOLIC BLOOD PRESSURE: 57 MMHG | SYSTOLIC BLOOD PRESSURE: 87 MMHG

## 2019-03-05 LAB
ALBUMIN SERPL BCG-MCNC: 3.8 G/DL (ref 3.2–5.2)
ALBUMIN/GLOB SERPL: 1.1 {RATIO} (ref 1–3)
ALP SERPL-CCNC: 68 U/L (ref 34–104)
ALT SERPL W P-5'-P-CCNC: 21 U/L (ref 7–52)
ANION GAP SERPL CALC-SCNC: 14 MMOL/L (ref 2–11)
ANION GAP SERPL CALC-SCNC: 15 MMOL/L (ref 2–11)
AST SERPL-CCNC: 19 U/L (ref 13–39)
BASOPHILS # BLD AUTO: 0 10^3/UL (ref 0–0.2)
BUN SERPL-MCNC: 73 MG/DL (ref 6–24)
BUN SERPL-MCNC: 86 MG/DL (ref 6–24)
BUN/CREAT SERPL: 18.5 (ref 8–20)
BUN/CREAT SERPL: 20.7 (ref 8–20)
CALCIUM SERPL-MCNC: 9.1 MG/DL (ref 8.6–10.3)
CALCIUM SERPL-MCNC: 9.7 MG/DL (ref 8.6–10.3)
CHLORIDE SERPL-SCNC: 115 MMOL/L (ref 101–111)
CHLORIDE SERPL-SCNC: 119 MMOL/L (ref 101–111)
EOSINOPHIL # BLD AUTO: 0 10^3/UL (ref 0–0.6)
GLOBULIN SER CALC-MCNC: 3.5 G/DL (ref 2–4)
GLUCOSE SERPL-MCNC: 370 MG/DL (ref 70–100)
GLUCOSE SERPL-MCNC: 503 MG/DL (ref 70–100)
HCO3 SERPL-SCNC: 23 MMOL/L (ref 22–32)
HCO3 SERPL-SCNC: 24 MMOL/L (ref 22–32)
HCT VFR BLD AUTO: 53 % (ref 42–52)
HGB BLD-MCNC: 17.1 G/DL (ref 14–18)
LYMPHOCYTES # BLD AUTO: 1.5 10^3/UL (ref 1–4.8)
MAGNESIUM SERPL-MCNC: 2.4 MG/DL (ref 1.9–2.7)
MCH RBC QN AUTO: 29 PG (ref 27–31)
MCHC RBC AUTO-ENTMCNC: 32 G/DL (ref 31–36)
MCV RBC AUTO: 92 FL (ref 80–94)
MONOCYTES # BLD AUTO: 0.6 10^3/UL (ref 0–0.8)
NEUTROPHILS # BLD AUTO: 13 10^3/UL (ref 1.5–7.7)
NRBC # BLD AUTO: 0.1 10^3/UL
NRBC BLD QL AUTO: 0.3
PLATELET # BLD AUTO: 176 10^3/UL (ref 150–450)
POTASSIUM SERPL-SCNC: 3.5 MMOL/L (ref 3.5–5)
POTASSIUM SERPL-SCNC: 4.1 MMOL/L (ref 3.5–5)
PROT SERPL-MCNC: 7.3 G/DL (ref 6.4–8.9)
RBC # BLD AUTO: 5.8 10^6/UL (ref 4–5.4)
SODIUM SERPL-SCNC: 153 MMOL/L (ref 135–145)
SODIUM SERPL-SCNC: 157 MMOL/L (ref 135–145)
WBC # BLD AUTO: 15.1 10^3/UL (ref 3.5–10.8)

## 2019-03-05 PROCEDURE — 009U3ZX DRAINAGE OF SPINAL CANAL, PERCUTANEOUS APPROACH, DIAGNOSTIC: ICD-10-PCS

## 2019-03-05 RX ADMIN — DEXMEDETOMIDINE HYDROCHLORIDE SCH: 100 INJECTION, SOLUTION, CONCENTRATE INTRAVENOUS at 11:37

## 2019-03-05 RX ADMIN — DEXMEDETOMIDINE HYDROCHLORIDE SCH: 100 INJECTION, SOLUTION, CONCENTRATE INTRAVENOUS at 03:37

## 2019-03-05 RX ADMIN — METOPROLOL TARTRATE SCH MG: 5 INJECTION, SOLUTION INTRAVENOUS at 11:42

## 2019-03-05 RX ADMIN — DEXMEDETOMIDINE HYDROCHLORIDE SCH MLS/HR: 100 INJECTION, SOLUTION, CONCENTRATE INTRAVENOUS at 03:03

## 2019-03-05 RX ADMIN — LEVOTHYROXINE SODIUM ANHYDROUS SCH MCG: 100 INJECTION, POWDER, LYOPHILIZED, FOR SOLUTION INTRAVENOUS at 05:47

## 2019-03-05 RX ADMIN — DEXMEDETOMIDINE HYDROCHLORIDE SCH: 100 INJECTION, SOLUTION, CONCENTRATE INTRAVENOUS at 11:51

## 2019-03-05 RX ADMIN — METOPROLOL TARTRATE SCH MG: 5 INJECTION, SOLUTION INTRAVENOUS at 03:05

## 2019-03-05 RX ADMIN — INSULIN LISPRO SCH UNITS: 100 INJECTION, SOLUTION INTRAVENOUS; SUBCUTANEOUS at 03:03

## 2019-03-05 NOTE — PN
Subjective


Date of Service: 03/05/19


Length of Stay: 7 Days





Neurology is following Mr. Nelson for the evaluation and management 

recommendation of encephalitis. 


Interval History: 


He is more confused and unresponsive to the surrounding environment. He has 

spontaneous cough.  There was a large mucous/clots removed out of the mouth.  

He does not follow command.  He has generalized weakness.  





LP was done at bedside.  Opening pressure was 0-1.  No CSF was collected.   





Labs: Creatinine: 4.65, BUN: 86; Sodium: 157


Review of Systems: 


Patient does not respond and cannot participate with a ROS. 








Objective


Active Medications: 








Acetaminophen (Tylenol Supp*)  650 mg CT Q6H PRN


   PRN Reason: Pain/Fever


   Last Admin: 03/04/19 01:52 Dose:  650 mg


Albuterol/Ipratropium (Duoneb (Albuterol 2.5 Mg/Ipratropium 0.5 Mg))  1 neb INH 

Q4H PRN


   PRN Reason: SOB/WHEEZING


Dextrose (D50w Syringe 50 Ml*)  12.5 gm IV PUSH .FOR FS < 60 - SS PRN


   PRN Reason: FS < 60


Heparin Sodium (Porcine) (Heparin Vial(*))  5,000 units SUBCUT Q8HR [facility]


   Last Admin: 03/05/19 05:47 Dose:  5,000 units


Hydralazine HCl (Apresoline Iv*)  10 mg IV SLOW PU Q6H PRN


   PRN Reason: Systolic Bp Greater Than:190


   Last Admin: 03/03/19 16:16 Dose:  10 mg


Methylprednisolone Sodium Succinate 1,000 mg/ Sodium Chloride  250 mls @ 250 mls

/hr IVPB Q24H [facility]


   Last Admin: 03/04/19 13:20 Dose:  250 mls/hr


Insulin Human Regular (Insulin Regular Iv Drip 1 Unit/Ml)  100 units in 100 mls 

@ 3 mls/hr IVPB Q24H [facility]


   Last Admin: 03/05/19 05:40 Dose:  3 mls/hr


Levothyroxine Sodium (Synthroid Inj*)  37.5 mcg IV 0600 [facility]


   Last Admin: 03/05/19 05:47 Dose:  37.5 mcg


Metoprolol Tartrate (Lopressor Iv*)  5 mg IV Q6H [facility]


   Last Admin: 03/05/19 11:42 Dose:  5 mg








 Vital Signs


 








  03/05/19 03/05/19 03/05/19





  11:45 12:00 12:16


 


Temperature   


 


Pulse Rate 99 95 91


 


Respiratory 23 25 24





Rate   


 


Blood Pressure 109/68 124/71 111/63





(mmHg)   


 


O2 Sat by Pulse 93 95 95





Oximetry   














  03/05/19





  12:31


 


Temperature 


 


Pulse Rate 85


 


Respiratory 24





Rate 


 


Blood Pressure 109/64





(mmHg) 


 


O2 Sat by Pulse 95





Oximetry 











Intake and Output Last 24 Hours











 03/03/19 03/04/19 03/05/19 03/06/19





 06:59 06:59 06:59 06:59


 


Intake Total 3348 1830 2754.6 0


 


Output Total 2980 5565 1695 140


 


Balance 368 -3735 1059.6 -140


 


Weight 326 lb 11.601 oz 317 lb 3.923 oz 311 lb 8.211 oz 


 


Intake:    


 


  IV Fluids 1918 427 0529 


 


    D5W 1775 150  


 


    D5W 1/2    


 


    NS (0.45%)   1165 


 


    NS (0.9%)  91 36 


 


    ivm  473  


 


  IVPB 5 309 767 


 


    ABX - CEFTRIAXONE   65 


 


    ABX - DOXYCYCLINE 5 309 250 


 


    Magnesium   112 


 


    NS (0.9%)   80 


 


    PB - METHYLPREDNISOLONE   260 


 


  Medicated IV 1360 807 536.6 


 


    CC - Dexmedetomidine/ 1360 807 526 





    Precedex    


 


    CC - Insulin   10.6 


 


  Oral 0 0 0 0


 


  Tube Feeding Flush Amount   250 


 


Output:    


 


  Urine  240  


 


  [place] 2980 5325 1695 140


 


Other:    


 


  Estimated Stool Amount Small Small  











Oxygen Devices in Use Now: OxyMask


Neurology Exam: 





General: 





Ill appearing obese man who is unresponsive. Neck: supple with no evidence of 

rigidity.   He groans and moans but does not appropriately respond. There is a 

large left upper arm non-mobile mass. 








Neurological Findings: 





Awake but non-verbal.  He has a non-breather mask on. He becomes slightly 

drowsy after a few minutes of alertness.  He does not track examiner. 


PERRL, EOM-I, no facial asymmetry.  


Does  not move any extremity with reduced tone throughout. 


He did not withdraw to noxious stimuli today. 


Reflexes: trace throughout.  Mute plantar response. 














Result Diagrams: 


 03/05/19 04:30





 03/05/19 13:15


Microbiology and Other Data: 


  








Assessment/Plan





1. 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 

paraneoplastic, or autoimmune encephalitis.  The absence of seizures and 

epileptiform discharges on EEG is atypical for paraneoplastic encephalitis.  

The severity of his cognitive decline is atypical for viral encephalitis.  The 

etiology remains unclear but his deterioration over the past 24 hours may in 

part be related to the current metabolic disturbance. 





He does not have high ICP pressure since we can barely get any CSF fluid on LP. 





Recommendations: 


- Continue SoluMedrol 1,000 mg IV x 2 more days for the presumptive diagnosis 

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 


- Repeat MRI brain without contrast (cannot do with contrast due to risk of 

nephrogenic systemic fibrosis).  


- He may need dialysis if kidney function continues to deteriorate


- Repeat EEG in the AM


- Intubation for airway protection and aspiration prevention is recommended. 

Discussed with ICU provider. 


- Pending paraneoplastic and CSF enterovirus results. 


- Repeat EEG once patient is intubated. 


- Neuro checks every 1 hour


- Continue supportive care


- Prognosis is guarded.  I discussed the case with the patient's son Nj on 

the phone who agreed with obtaining the above work-up.  Nj informed me that 

he would like to give his father a fighting chance even if there is the 

slightest percentage of improvement. 





Time spent: 40 minutes of critical care time was spent reviewing the history, 

examining the patient, discussing the treatment plan with the ICU team and 

Nj.

## 2019-03-05 NOTE — PRO
LUMBAR PUNCTURE PROCEDURE NOTE:

 

DATE OF PROCEDURE:  03/05/19 - ROOM #ICU-08

 

PRE-PROCEDURE DIAGNOSIS:  Acute encephalitis.

 

POST-PROCEDURE DIAGNOSIS:  Acute encephalitis.

 

INDICATION:  Diagnostic and therapeutic to assess for elevated intracranial 
pressure.

 

PROCEDURE IN DETAIL:  Consent.  This procedure was done at an emergent basis 
due to the concern that the patient may have elevated ICP pressure.

 

Time-out was performed with the bedside nurse, Jazmine, at 9:30 a.m.  Under 
sterile condition, the patient was positioned in the left lateral decubitus 
position in a semi-fetal position.  Chlorhexidine solution and sterile drapes 
were utilized.  A 22-gauge 3.5-inch spine needle was inserted in the space of L2
-L3 interspace.

 

FINDINGS:  The 2 cc of spinal fluid was drained after checking the pressure, 
which was 1 cm H2O.  Unable to obtain any CSF fluid due to significantly low 
CSF pressure.

 

COMPLICATIONS:  None.

 

CONDITION:  Stable as prior to the surgery.

 

PLAN:  We will most likely need to confirm the low CSF pressure by repeating 
the lumbar puncture by anesthesia under fluoroscopy.

 

 [id number]/CPS #: [id number]

ANALIA

## 2019-03-05 NOTE — PN
Date of Service: 19


Critical Care Services: 


74 yo M with PMH HTN, CAD, DMII, morbid obesity, HLD admitted for AMS.  





Patient seen and examined at the bedside


Patient is on a face mask, non-responsive to voice or painful stimuli


No fever





Patient was noted to encephalopathic and in the evening had a bowel movement 

followed by cardiac arrest.  Despite appropriate measures, patient did not have 

ROSC and  as a result 





Vital Signs: 











Temp Pulse Resp BP SpO2 FiO2


 


99.2 F 100 25 87/57 95 


 


19 11:41 19 13:31 19 13:31 19 13:31 19 13:31 











Physical Exam: 


Gen: obese man, unresponsive to voice or painful stimuli


HEENT:NCAT, neck supple





Lungs:air entry bilaterally but shallow breath sounds 





Cardiac:  +S1S2





Abdomen:obese, soft NT 





Extremities:trace edema 





Neuro:eyes open, non-verbal, nonfocal 





Fluid Balance (Past 24 Hours): 


I=     O=     Net 


 Intake & Output











 19





 06:59 06:59 06:59 06:59


 


Intake Total 3348 1830 2754.6 0


 


Output Total 2980 5565 1695 145


 


Balance 368 -3735 1059.6 -145


 


Weight 326 lb 11.601 oz 317 lb 3.923 oz 311 lb 8.211 oz 


 


Intake:    


 


  IV Fluids 8640 239 6073 


 


    D5W 1775 150  


 


    D5W 1/2    


 


    NS (0.45%)   1165 


 


    NS (0.9%)  91 36 


 


    ivm  473  


 


  IVPB 5 309 767 


 


    ABX - CEFTRIAXONE   65 


 


    ABX - DOXYCYCLINE 5 309 250 


 


    Magnesium   112 


 


    NS (0.9%)   80 


 


    PB - METHYLPREDNISOLONE   260 


 


  Medicated IV 1360 807 536.6 


 


    CC - Dexmedetomidine/ 1360 807 526 





    Precedex    


 


    CC - Insulin   10.6 


 


  Oral 0 0 0 0


 


  Tube Feeding Flush Amount   250 


 


Output:    


 


  Urine  240  


 


  [place] 2980 5325 1695 145


 


Other:    


 


  Estimated Stool Amount Small Small  





 





ADLs: Meal  Record                                         Start:  19 17:

28


Freq:                                                      Status: Discharge   

  


Protocol:                                                                      

  


 Created      19 17:28  System  (Rec: 19 17:28  System  ICU-C25)


 Document     19 09:00  PKS7868  (Rec: 19 11:48  OWE3708  ICU-C12)


 Document     19 13:00  QDI1602  (Rec: 19 15:46  JPT4070  ICU-C12)


 Document     19 18:00  OMY2012  (Rec: 19 19:55  MOP6887  ICU-C12)


Intake and Output                                          Start:  19 07:

33


Freq:                                                      Status: Discharge   

  


Protocol:                                                                      

  


 Created      19 07:33  System  (Rec: 19 07:33  System  EDRM-C15)


 Document     19 09:30  HUF9624  (Rec: 19 10:54  RXL9504  ED-C18)


 Document     19 16:07  HPG7482  (Rec: 19 16:07  UZX0414  ED-C18)


Intake and Output                                          Start:  19 17:

28


Freq:   Q1HR                                               Status: Discharge   

  


Protocol:                                                                      

  


 Created      19 17:28  System  (Rec: 19 17:28  System  ICU-C25)


 Document     19 19:58  VXJ8368  (Rec: 19 19:58  DZK3907  ICU-M19)


 Document     19 21:00  KSB8700  (Rec: 19 22:16  KAI1947  ICU-C07)


 Document     19 22:00  UDK8453  (Rec: 19 22:16  MOU3530  ICU-C07)


 Document     19 23:00  JJB5348  (Rec: 19 23:44  BLU6770  ICU-M19)


 Document     19 23:52  RJH4593  (Rec: 19 23:57  CDD8308  ICU-M19)


 Document     19 01:00  SXH8102  (Rec: 19 01:15  TVR8594  ICU-C07)


 Document     19 02:00  UKW3533  (Rec: 19 02:02  NXC3317  ICU-C07)


 Document     19 02:55  ZSL7653  (Rec: 19 02:55  RPQ0797  ICU-C07)


 Document     19 04:00  TGF2318  (Rec: 19 04:43  YBJ9255  ICU-C07)


 Document     19 05:00  LKF4460  (Rec: 19 05:02  ZVV4311  ICU-C07)


 Document     19 05:49  RCZ6972  (Rec: 19 05:52  HJV1360  ICU-M19)


 Document     19 11:00  DUK3186  (Rec: 19 11:48  YBY1089  ICU-C12)


 Document     19 12:00  BYU3292  (Rec: 19 15:45  DAO9042  ICU-C12)


 Document     19 15:00  GXH7872  (Rec: 19 15:45  WHA0605  ICU-C12)


 Document     19 20:00  AEZ8282  (Rec: 19 20:32  RDD2762  ICU-C07)


 Document     19 21:00  KOS9377  (Rec: 19 21:35  GSC5556  ICU-C07)


 Document     19 22:00  RKK8539  (Rec: 19 22:04  MUU5274  ICU-M19)


 Document     19 23:00  GFB2206  (Rec: 19 23:14  UIY6202  ICU-C07)


 Document     19 23:39  UOQ8097  (Rec: 19 23:48  KUO5472  ICU-C07)


 Document     19 01:00  YTG9044  (Rec: 19 02:17  PHV6828  ICU-C07)


 Document     19 02:00  WPX3955  (Rec: 19 02:18  EJP5705  ICU-C07)


 Document     19 03:00  VXY0202  (Rec: 19 03:55  VWI6954  ICU-C07)


 Document     19 03:55  IBP0255  (Rec: 19 04:06  NVX3156  ICU-C07)


 Document     19 05:00  XNH8452  (Rec: 19 05:17  IJY6319  ICU-C07)


 Document     19 06:00  LIH2867  (Rec: 19 06:49  ZDQ0703  ICU-M19)


 Document     19 07:00  HVL4858  (Rec: 19 09:01  IVY7636  ICU-M19)


 Document     19 08:00  BZK9178  (Rec: 19 09:01  UJX0550  ICU-M19)


 Document     19 09:00  XQP7039  (Rec: 19 09:01  VOD7304  ICU-M19)


 Document     19 10:00  LFS6395  (Rec: 19 12:16  BXJ4835  ICU-M19)


 Document     19 12:00  IXA9531  (Rec: 19 12:16  YCY9405  ICU-M19)


 Document     19 13:00  LJN4305  (Rec: 19 14:26  DJR7893  ICU-C15)


 Document     19 14:00  UTT6757  (Rec: 19 14:26  QLG1590  ICU-C15)


 Document     19 15:00  AYK4044  (Rec: 19 16:15  OXB8528  ICU-M32)


 Document     19 16:00  FAU9151  (Rec: 19 16:15  NRJ8698  ICU-M32)


 Document     19 16:38  BVN3412  (Rec: 19 16:39  ANN6355  ICU-M32)


 Document     19 18:00  SQC8960  (Rec: 19 19:04  BLX9678  ICU-M32)


 Document     19 19:00  OEU3981  (Rec: 19 20:30  KAG1757  ICU-C06)


 Document     19 20:00  GUS4965  (Rec: 19 20:30  VXX5888  ICU-C06)


 Document     19 21:00  LOF7181  (Rec: 19 22:10  BFB9472  ICU-M19)


 Document     19 22:00  PUS6671  (Rec: 19 22:10  EHY5258  ICU-M19)


 Document     19 23:00  VHP4980  (Rec: 19 00:07  PGE1982  ICU-C06)


 Document     19 00:00  HRR3419  (Rec: 19 00:07  ATU6480  ICU-C06)


 Document     19 01:00  IJR4644  (Rec: 19 02:29  ERB8145  ICU-C06)


 Document     19 02:00  NGN0260  (Rec: 19 02:29  IEU5763  ICU-C06)


 Document     19 03:00  GYP3174  (Rec: 19 05:47  KEB2361  ICU-C06)


 Document     19 04:00  VKC9987  (Rec: 19 05:47  AEU2242  ICU-C06)


 Document     19 05:00  SAB7859  (Rec: 19 05:47  KFB5486  ICU-C06)


 Document     19 06:00  AXS6642  (Rec: 19 06:36  SYN2041  ICU-M19)


 Document     19 07:00  HTO2682  (Rec: 19 07:16  UYI0853  ICU-C20)


 Document     19 08:00  RZL9041  (Rec: 19 08:04  PSL6527  ICU-M19)


 Document     19 09:00  HOY4051  (Rec: 19 10:06  RCB7401  ICU-M19)


 Document     19 10:00  FCT8210  (Rec: 19 10:07  KLS3267  ICU-M19)


 Document     19 11:05  SBM3907  (Rec: 19 11:05  EUX7920  ICU-M19)


 Document     19 12:00  ALH1557  (Rec: 19 14:59  ADH4378  ICU-C06)


 Document     19 13:00  OTA4862  (Rec: 19 14:59  YXS5921  ICU-C06)


 Document     19 14:00  IUW9018  (Rec: 19 15:00  QUB6699  ICU-C06)


 Document     19 15:00  SJT7536  (Rec: 19 16:21  PQJ3633  ICU-C06)


 Document     19 16:10  SQW6010  (Rec: 19 16:40  KBR5902  ICU-C06)


 Document     19 17:00  CBX4895  (Rec: 19 19:22  EHY0777  ICU-C08)


 Document     19 18:00  HYF9079  (Rec: 19 19:25  QAN2220  ICU-C08)


 Document     19 19:00  LDE5762  (Rec: 19 20:10  IDF8993  ICU-M19)


 Document     19 20:00  OOS1660  (Rec: 19 20:10  DXL5333  ICU-M19)


 Document     19 21:00  EFO2459  (Rec: 19 22:11  VXK5089  ICU-C06)


 Document     19 22:00  TTO1653  (Rec: 19 22:20  SPA5753  ICU-C06)


 Document     19 23:00  AZX3999  (Rec: 19 00:13  DIT5730  ICU-C06)


 Document     19 00:00  AHO6683  (Rec: 19 02:20  YPG3470  ICU-C06)


 Document     19 01:00  ECX8808  (Rec: 19 02:20  JKU8047  ICU-C06)


 Document     19 02:00  OFF5417  (Rec: 19 02:20  YXM8491  ICU-C06)


 Document     19 03:00  YOF8831  (Rec: 19 03:06  GDM7540  ICU-C06)


 Document     19 04:00  SIQ7897  (Rec: 19 06:07  PAS7044  ICU-M19)


 Document     19 05:00  UXF4814  (Rec: 19 06:07  SNH3310  ICU-M19)


 Document     19 06:00  BEY9072  (Rec: 19 06:07  HLI6548  ICU-M19)


 Document     19 08:59  JUJ2607  (Rec: 19 08:59  IWL8978  ICU-M19)


 Document     19 13:00  YKW3799  (Rec: 19 13:12  PRX4220  ICU-C12)


 Document     19 15:00  BGU4055  (Rec: 19 15:08  OFS7820  ICU-C12)


 Document     19 16:00  LZP4102  (Rec: 19 16:24  PFF9820  ICU-C07)


 Document     19 17:00  VOF1774  (Rec: 19 18:59  ICR8056  ICU-C07)


 Document     19 18:00  VYA3544  (Rec: 19 19:03  WAZ3553  ICU-C07)


 Document     19 19:00  AGC7128  (Rec: 19 19:21  ZLE1614  ICU-M19)


 Document     19 19:39  XZD7973  (Rec: 19 19:51  MPS2908  ICU-C06)


 Document     19 20:53  QCR0850  (Rec: 19 20:53  BAD4291  ICU-C06)


 Document     19 22:00  ISN2778  (Rec: 19 22:15  RZQ2596  ICU-C06)


 Document     19 23:00  EGF0676  (Rec: 19 23:03  YDT5336  ICU-M19)


 Document     19 00:00  PVW9136  (Rec: 19 00:13  ROG3203  ICU-C06)


 Document     19 01:00  GBB4955  (Rec: 19 01:34  XTD2266  ICU-C06)


 Document     19 02:00  NIM0050  (Rec: 19 03:02  ESN8394  ICU-C06)


 Document     19 03:00  JTA7744  (Rec: 19 03:02  IUO9727  ICU-C06)


 Document     19 04:00  GPL1453  (Rec: 19 04:08  NPO9995  ICU-C06)


 Document     19 05:00  FQA7064  (Rec: 19 05:19  WFM9672  ICU-C06)


 Document     19 06:00  SKA8412  (Rec: 19 06:15  JAQ2340  ICU-M19)


 Document     19 07:00  BCF5118  (Rec: 19 08:33  TUA5978  ICU-C06)


 Document     19 08:00  SKA6681  (Rec: 19 08:38  AIR8906  ICU-C06)


 Document     19 09:00  ZLE5329  (Rec: 19 10:35  UEH5569  ICU-C06)


 Document     19 10:00  VII5448  (Rec: 19 10:37  TBR9783  ICU-C06)


 Document     19 11:00  ZIN5020  (Rec: 19 11:04  ATZ9825  ICU-C06)


 Document     19 12:00  IGY7185  (Rec: 19 13:05  FHK3628  ICU-C06)


 Document     19 13:00  YZU4675  (Rec: 19 13:05  PMN3483  ICU-C06)


 Document     19 14:00  VMT4412  (Rec: 19 14:20  PVZ6077  ICU-C06)


 Document     19 15:00  NGB6788  (Rec: 19 15:25  VVR4142  ICU-C06)


 Document     19 15:26  MCZ5646  (Rec: 19 15:32  OLI6439  ICU-C06)


 Document     19 17:00  YQP7715  (Rec: 19 17:43  MRA4371  ICU-C06)


 Document     19 17:44  FHQ9475  (Rec: 19 17:45  DYV9294  ICU-C06)


 Document     19 19:00  AAQ6816  (Rec: 19 20:31  CQI4411  ICU-C06)


 Document     19 20:00  UFO1232  (Rec: 19 20:31  MFB5450  ICU-C06)


 Document     19 21:00  ZRY7218  (Rec: 19 21:08  BNA6775  ICU-C06)


 Document     19 22:00  SKA1884  (Rec: 19 23:30  GHJ5150  ICU-C06)


 Document     19 23:00  KJA5572  (Rec: 19 23:30  CRZ1767  ICU-C06)


 Document     19 23:43  FYD5654  (Rec: 19 23:47  QNG3686  ICU-M19)


 Document     19 01:00  PZK3446  (Rec: 19 01:48  XDG3828  ICU-C06)


 Document     19 02:00  VJL9500  (Rec: 19 02:03  CPC4234  ICU-M19)


 Document     19 03:00  ADR1525  (Rec: 19 03:02  PTS3507  ICU-M19)


 Document     19 03:54  ASX6300  (Rec: 19 03:55  TLZ0242  ICU-C06)


 Document     19 05:00  FDW3846  (Rec: 19 05:30  MLE8644  ICU-C06)


 Document     19 06:00  JZU5529  (Rec: 19 06:05  PSD7904  ICU-M19)


 Document     19 07:00  ERV8616  (Rec: 19 08:04  UNX4423  ICU-M19)


 Document     19 08:00  IRX4596  (Rec: 19 08:04  VWV3017  ICU-M19)


 Document     19 09:00  OCR0827  (Rec: 19 11:34  WKB6875  ICU-C06)


 Document     19 10:00  VSM5048  (Rec: 19 11:34  FQQ2776  ICU-C06)


 Document     19 11:00  ZWJ7369  (Rec: 19 11:34  KTA1988  ICU-C06)


 Document     19 12:00  TVS7472  (Rec: 19 18:00  BRC8027  ICU-M19)


 Document     19 13:00  ZFO9516  (Rec: 19 18:00  KVF9209  ICU-M19)


 Document     19 14:00  POJ7663  (Rec: 19 18:00  TDL6436  ICU-M19)


 Document     19 15:00  FKZ8542  (Rec: 19 18:00  IHK4014  ICU-M19)


 Document     19 16:00  NKG1942  (Rec: 19 18:00  CBR4400  ICU-M19)


 Document     19 17:00  HVZ7648  (Rec: 19 18:00  JNK0073  ICU-M19)


 Document     19 18:00  YGW2583  (Rec: 19 18:00  HMD6566  ICU-M19)


 Document     19 20:00  DVA9772  (Rec: 19 20:58  TYM8512  ICU-C12)


 Document     19 01:00  VRK5231  (Rec: 19 02:25  NRD6650  ICU-C12)


 Document     19 03:00  IOS7515  (Rec: 19 03:28  KCO4400  ICU-M19)


 Document     19 03:30  RQZ9382  (Rec: 19 03:34  JWX5905  ICU-M19)


 Document     19 06:00  JDQ6509  (Rec: 19 06:59  WAF9505  ICU-C12)


 Document     19 07:00  ERC6679  (Rec: 19 07:00  UNW2808  ICU-C12)


 Document     19 07:40  QLK6245  (Rec: 19 07:43  AEJ7519  ICU-C12)


 Document     19 11:19  IMV9797  (Rec: 19 11:19  DPV2022  ICU-M19)


 Document     19 12:00  FLD7743  (Rec: 19 16:52  MOG8822  ICU-C12)








Labs: 


 Laboratory Results - last 24 hr











  19





  11:01 18:45 18:25


 


WBC   


 


RBC   


 


Hgb   


 


Hct   


 


MCV   


 


MCH   


 


MCHC   


 


RDW   


 


Plt Count   


 


MPV   


 


Neut % (Auto)   


 


Lymph % (Auto)   


 


Mono % (Auto)   


 


Eos % (Auto)   


 


Baso % (Auto)   


 


Absolute Neuts (auto)   


 


Absolute Lymphs (auto)   


 


Absolute Monos (auto)   


 


Absolute Eos (auto)   


 


Absolute Basos (auto)   


 


Absolute Nucleated RBC   


 


Nucleated RBC %   


 


Patient Temperature   


 


ABG pH   


 


ABG pH (Temp Correct)   


 


ABG pCO2   


 


ABG pCO2 (Temp Corrct   


 


ABG pO2   


 


ABG pO2 (Temp Correct   


 


ABG HCO3   


 


ABG O2 Saturation   


 


ABG Base Excess   


 


Respiration Rate   


 


O2 Delivery Device   


 


Ventilator Type   


 


Vent Mode   


 


FiO2   


 


Inspiratory Time   


 


PEEP   


 


Pressure Support   


 


Pressure Control   


 


EPAP   


 


IPAP   


 


BiPAP   


 


Sodium   


 


Potassium   


 


Chloride   


 


Carbon Dioxide   


 


Anion Gap   


 


BUN   


 


Creatinine   


 


Est GFR ( Amer)   


 


Est GFR (Non-Af Amer)   


 


BUN/Creatinine Ratio   


 


Glucose   


 


POC Glucose (mg/dL)    320 H


 


Glucose Meter Confirm   


 


Calcium   


 


Phosphorus   


 


Magnesium   


 


Total Bilirubin   


 


AST   


 


ALT   


 


Alkaline Phosphatase   


 


Total Protein   


 


Albumin   


 


Globulin   


 


Albumin/Globulin Ratio   


 


Vitamin B12   


 


Lyme Total Antibody   Negative 


 


CMV Qnt PCR IU/mL  Undetected  














  19





  02:31 03:00 04:30


 


WBC    15.1 H


 


RBC    5.80 H


 


Hgb    17.1


 


Hct    53 H


 


MCV    92


 


MCH    29


 


MCHC    32


 


RDW    15


 


Plt Count    176


 


MPV    9.2


 


Neut % (Auto)    86.0


 


Lymph % (Auto)    9.9


 


Mono % (Auto)    3.8


 


Eos % (Auto)    0


 


Baso % (Auto)    0.3


 


Absolute Neuts (auto)    13.0 H


 


Absolute Lymphs (auto)    1.5


 


Absolute Monos (auto)    0.6


 


Absolute Eos (auto)    0


 


Absolute Basos (auto)    0


 


Absolute Nucleated RBC    0.1


 


Nucleated RBC %    0.3


 


Patient Temperature   


 


ABG pH   


 


ABG pH (Temp Correct)   


 


ABG pCO2   


 


ABG pCO2 (Temp Corrct   


 


ABG pO2   


 


ABG pO2 (Temp Correct   


 


ABG HCO3   


 


ABG O2 Saturation   


 


ABG Base Excess   


 


Respiration Rate   


 


O2 Delivery Device   


 


Ventilator Type   


 


Vent Mode   


 


FiO2   


 


Inspiratory Time   


 


PEEP   


 


Pressure Support   


 


Pressure Control   


 


EPAP   


 


IPAP   


 


BiPAP   


 


Sodium   


 


Potassium   


 


Chloride   


 


Carbon Dioxide   


 


Anion Gap   


 


BUN   


 


Creatinine   


 


Est GFR ( Amer)   


 


Est GFR (Non-Af Amer)   


 


BUN/Creatinine Ratio   


 


Glucose   


 


POC Glucose (mg/dL)  442 H*  


 


Glucose Meter Confirm   497 H 


 


Calcium   


 


Phosphorus   


 


Magnesium   


 


Total Bilirubin   


 


AST   


 


ALT   


 


Alkaline Phosphatase   


 


Total Protein   


 


Albumin   


 


Globulin   


 


Albumin/Globulin Ratio   


 


Vitamin B12   


 


Lyme Total Antibody   


 


CMV Qnt PCR IU/mL   














  19





  04:30 05:11 05:57


 


WBC   


 


RBC   


 


Hgb   


 


Hct   


 


MCV   


 


MCH   


 


MCHC   


 


RDW   


 


Plt Count   


 


MPV   


 


Neut % (Auto)   


 


Lymph % (Auto)   


 


Mono % (Auto)   


 


Eos % (Auto)   


 


Baso % (Auto)   


 


Absolute Neuts (auto)   


 


Absolute Lymphs (auto)   


 


Absolute Monos (auto)   


 


Absolute Eos (auto)   


 


Absolute Basos (auto)   


 


Absolute Nucleated RBC   


 


Nucleated RBC %   


 


Patient Temperature   Not Reportable 


 


ABG pH   7.32 L 


 


ABG pH (Temp Correct)   Not Reportable 


 


ABG pCO2   48 H 


 


ABG pCO2 (Temp Corrct   Not Reportable 


 


ABG pO2   112 H 


 


ABG pO2 (Temp Correct   Not Reportable 


 


ABG HCO3   23.5 


 


ABG O2 Saturation   99.2 H 


 


ABG Base Excess   -1.8 


 


Respiration Rate   Not Reportable 


 


O2 Delivery Device   oxi mask 


 


Ventilator Type   Not Reportable 


 


Vent Mode   Not Reportable 


 


FiO2   Not Reportable 


 


Inspiratory Time   Not Reportable 


 


PEEP   Not Reportable 


 


Pressure Support   Not Reportable 


 


Pressure Control   Not Reportable 


 


EPAP   Not Reportable 


 


IPAP   Not Reportable 


 


BiPAP   Not Reportable 


 


Sodium  153 H  


 


Potassium  4.1  


 


Chloride  115 H  


 


Carbon Dioxide  24  


 


Anion Gap  14 H  


 


BUN  73 H  


 


Creatinine  3.53 H  


 


Est GFR ( Amer)  20.7  


 


Est GFR (Non-Af Amer)  17.1  


 


BUN/Creatinine Ratio  20.7 H  


 


Glucose  503 H*  


 


POC Glucose (mg/dL)    > 444 H*


 


Glucose Meter Confirm   


 


Calcium  9.7  


 


Phosphorus  5.0  


 


Magnesium  2.4  


 


Total Bilirubin  0.80  


 


AST  19  


 


ALT  21  


 


Alkaline Phosphatase  68  


 


Total Protein  7.3  


 


Albumin  3.8  


 


Globulin  3.5  


 


Albumin/Globulin Ratio  1.1  


 


Vitamin B12   


 


Lyme Total Antibody   


 


CMV Qnt PCR IU/mL   














  19





  06:07 07:05 07:08


 


WBC   


 


RBC   


 


Hgb   


 


Hct   


 


MCV   


 


MCH   


 


MCHC   


 


RDW   


 


Plt Count   


 


MPV   


 


Neut % (Auto)   


 


Lymph % (Auto)   


 


Mono % (Auto)   


 


Eos % (Auto)   


 


Baso % (Auto)   


 


Absolute Neuts (auto)   


 


Absolute Lymphs (auto)   


 


Absolute Monos (auto)   


 


Absolute Eos (auto)   


 


Absolute Basos (auto)   


 


Absolute Nucleated RBC   


 


Nucleated RBC %   


 


Patient Temperature   


 


ABG pH   


 


ABG pH (Temp Correct)   


 


ABG pCO2   


 


ABG pCO2 (Temp Corrct   


 


ABG pO2   


 


ABG pO2 (Temp Correct   


 


ABG HCO3   


 


ABG O2 Saturation   


 


ABG Base Excess   


 


Respiration Rate   


 


O2 Delivery Device   


 


Ventilator Type   


 


Vent Mode   


 


FiO2   


 


Inspiratory Time   


 


PEEP   


 


Pressure Support   


 


Pressure Control   


 


EPAP   


 


IPAP   


 


BiPAP   


 


Sodium   


 


Potassium   


 


Chloride   


 


Carbon Dioxide   


 


Anion Gap   


 


BUN   


 


Creatinine   


 


Est GFR ( Amer)   


 


Est GFR (Non-Af Amer)   


 


BUN/Creatinine Ratio   


 


Glucose   


 


POC Glucose (mg/dL)   427 H* 


 


Glucose Meter Confirm  497 H   483 H


 


Calcium   


 


Phosphorus   


 


Magnesium   


 


Total Bilirubin   


 


AST   


 


ALT   


 


Alkaline Phosphatase   


 


Total Protein   


 


Albumin   


 


Globulin   


 


Albumin/Globulin Ratio   


 


Vitamin B12   


 


Lyme Total Antibody   


 


CMV Qnt PCR IU/mL   














  19





  07:59 08:02 09:13


 


WBC   


 


RBC   


 


Hgb   


 


Hct   


 


MCV   


 


MCH   


 


MCHC   


 


RDW   


 


Plt Count   


 


MPV   


 


Neut % (Auto)   


 


Lymph % (Auto)   


 


Mono % (Auto)   


 


Eos % (Auto)   


 


Baso % (Auto)   


 


Absolute Neuts (auto)   


 


Absolute Lymphs (auto)   


 


Absolute Monos (auto)   


 


Absolute Eos (auto)   


 


Absolute Basos (auto)   


 


Absolute Nucleated RBC   


 


Nucleated RBC %   


 


Patient Temperature   


 


ABG pH   


 


ABG pH (Temp Correct)   


 


ABG pCO2   


 


ABG pCO2 (Temp Corrct   


 


ABG pO2   


 


ABG pO2 (Temp Correct   


 


ABG HCO3   


 


ABG O2 Saturation   


 


ABG Base Excess   


 


Respiration Rate   


 


O2 Delivery Device   


 


Ventilator Type   


 


Vent Mode   


 


FiO2   


 


Inspiratory Time   


 


PEEP   


 


Pressure Support   


 


Pressure Control   


 


EPAP   


 


IPAP   


 


BiPAP   


 


Sodium   


 


Potassium   


 


Chloride   


 


Carbon Dioxide   


 


Anion Gap   


 


BUN   


 


Creatinine   


 


Est GFR ( Amer)   


 


Est GFR (Non-Af Amer)   


 


BUN/Creatinine Ratio   


 


Glucose   


 


POC Glucose (mg/dL)  402 H*   402 H*


 


Glucose Meter Confirm   461 H 


 


Calcium   


 


Phosphorus   


 


Magnesium   


 


Total Bilirubin   


 


AST   


 


ALT   


 


Alkaline Phosphatase   


 


Total Protein   


 


Albumin   


 


Globulin   


 


Albumin/Globulin Ratio   


 


Vitamin B12   


 


Lyme Total Antibody   


 


CMV Qnt PCR IU/mL   














  19





  10:07 11:26 11:59


 


WBC   


 


RBC   


 


Hgb   


 


Hct   


 


MCV   


 


MCH   


 


MCHC   


 


RDW   


 


Plt Count   


 


MPV   


 


Neut % (Auto)   


 


Lymph % (Auto)   


 


Mono % (Auto)   


 


Eos % (Auto)   


 


Baso % (Auto)   


 


Absolute Neuts (auto)   


 


Absolute Lymphs (auto)   


 


Absolute Monos (auto)   


 


Absolute Eos (auto)   


 


Absolute Basos (auto)   


 


Absolute Nucleated RBC   


 


Nucleated RBC %   


 


Patient Temperature   


 


ABG pH   


 


ABG pH (Temp Correct)   


 


ABG pCO2   


 


ABG pCO2 (Temp Corrct   


 


ABG pO2   


 


ABG pO2 (Temp Correct   


 


ABG HCO3   


 


ABG O2 Saturation   


 


ABG Base Excess   


 


Respiration Rate   


 


O2 Delivery Device   


 


Ventilator Type   


 


Vent Mode   


 


FiO2   


 


Inspiratory Time   


 


PEEP   


 


Pressure Support   


 


Pressure Control   


 


EPAP   


 


IPAP   


 


BiPAP   


 


Sodium   


 


Potassium   


 


Chloride   


 


Carbon Dioxide   


 


Anion Gap   


 


BUN   


 


Creatinine   


 


Est GFR ( Amer)   


 


Est GFR (Non-Af Amer)   


 


BUN/Creatinine Ratio   


 


Glucose   


 


POC Glucose (mg/dL)  377 H  372 H  353 H


 


Glucose Meter Confirm   


 


Calcium   


 


Phosphorus   


 


Magnesium   


 


Total Bilirubin   


 


AST   


 


ALT   


 


Alkaline Phosphatase   


 


Total Protein   


 


Albumin   


 


Globulin   


 


Albumin/Globulin Ratio   


 


Vitamin B12   


 


Lyme Total Antibody   


 


CMV Qnt PCR IU/mL   














  19





  13:15


 


WBC 


 


RBC 


 


Hgb 


 


Hct 


 


MCV 


 


MCH 


 


MCHC 


 


RDW 


 


Plt Count 


 


MPV 


 


Neut % (Auto) 


 


Lymph % (Auto) 


 


Mono % (Auto) 


 


Eos % (Auto) 


 


Baso % (Auto) 


 


Absolute Neuts (auto) 


 


Absolute Lymphs (auto) 


 


Absolute Monos (auto) 


 


Absolute Eos (auto) 


 


Absolute Basos (auto) 


 


Absolute Nucleated RBC 


 


Nucleated RBC % 


 


Patient Temperature 


 


ABG pH 


 


ABG pH (Temp Correct) 


 


ABG pCO2 


 


ABG pCO2 (Temp Corrct 


 


ABG pO2 


 


ABG pO2 (Temp Correct 


 


ABG HCO3 


 


ABG O2 Saturation 


 


ABG Base Excess 


 


Respiration Rate 


 


O2 Delivery Device 


 


Ventilator Type 


 


Vent Mode 


 


FiO2 


 


Inspiratory Time 


 


PEEP 


 


Pressure Support 


 


Pressure Control 


 


EPAP 


 


IPAP 


 


BiPAP 


 


Sodium  157 H*


 


Potassium  3.5


 


Chloride  119 H


 


Carbon Dioxide  23


 


Anion Gap  15 H


 


BUN  86 H


 


Creatinine  4.65 H


 


Est GFR ( Amer)  15.0


 


Est GFR (Non-Af Amer)  12.4


 


BUN/Creatinine Ratio  18.5


 


Glucose  370 H


 


POC Glucose (mg/dL) 


 


Glucose Meter Confirm 


 


Calcium  9.1


 


Phosphorus 


 


Magnesium 


 


Total Bilirubin 


 


AST 


 


ALT 


 


Alkaline Phosphatase 


 


Total Protein 


 


Albumin 


 


Globulin 


 


Albumin/Globulin Ratio 


 


Vitamin B12  Cancelled


 


Lyme Total Antibody 


 


CMV Qnt PCR IU/mL 











Impression: 





- Acute metabolic encephalopathy due to possible encephalitis- viral, 

paraneoplastic, or autoimmune encephaliti


- MASON 4.65<--0.97


- Leukocytosis 


- Hypovolemia 


- hypernatremia 


- Hyperglycemia 


- cardiac arrest





Plan: 











- Acute metabolic encephalopathy due to possible encephalitis- viral, 

paraneoplastic, or autoimmune encephaliti


- MASON 4.65<--0.97


- Leukocytosis 


- Hypovolemia 


- hypernatremia 


- Hyperglycemia 


- cardiac arrest





Plan was to continue with current therapy with Solumedrol.  After discussion 

with family and neurology, we were planning to intubate patient and secure his 

airway before proceeding towards further workup including repeat MRI and IR-LP.

  Unfortunately, patient suffered a cardiac arrest after a bowel movement.  

Despite appropriate ACLS measures, there was no ROSC at the end of 25 minutes.  

On Echocardiogram, there was no cardiac function, no palpable or dopplered 

pulses.  On physical exam there was no spontaneous movement, pupils were 

dilated and fixed, no spontaneous breath sounds, no cardiac sounds.  Patient 

was pronounced dead at 14:30 today.  Son, Nj, was notified upon arrival.  

He has agreed to autopsy.  





Critical care spent: 60 minutes

## 2019-03-05 NOTE — PN
Progress Note





- Progress Note


Date of Service: 03/05/19


Note: 





Large tissue products was pulled from patient's oropharnyx.  Will send for 

culture and pathology review.

## 2019-03-06 LAB
SPECIMEN SOURCE: (no result)
SPECIMEN SOURCE: (no result)
VZV IGG SER IA-ACNC: 4.5

## 2019-03-18 LAB — VGKC AB SER-SCNC: 0 NMOL/L (ref ?–0.02)
